# Patient Record
Sex: FEMALE | Race: WHITE | NOT HISPANIC OR LATINO | Employment: FULL TIME | URBAN - METROPOLITAN AREA
[De-identification: names, ages, dates, MRNs, and addresses within clinical notes are randomized per-mention and may not be internally consistent; named-entity substitution may affect disease eponyms.]

---

## 2024-04-30 PROBLEM — F41.8 DEPRESSION WITH ANXIETY: Status: ACTIVE | Noted: 2024-04-30

## 2024-04-30 PROBLEM — M32.9 SLE (SYSTEMIC LUPUS ERYTHEMATOSUS) (MULTI): Status: ACTIVE | Noted: 2024-04-30

## 2024-04-30 PROBLEM — M54.9 CHRONIC BACK PAIN: Status: ACTIVE | Noted: 2024-04-30

## 2024-04-30 PROBLEM — R05.9 COUGH: Status: ACTIVE | Noted: 2024-04-30

## 2024-04-30 PROBLEM — N83.209 SIMPLE OVARIAN CYST: Status: ACTIVE | Noted: 2024-04-30

## 2024-04-30 PROBLEM — G43.909 MIGRAINES: Status: ACTIVE | Noted: 2024-04-30

## 2024-04-30 PROBLEM — H92.09 OTALGIA: Status: ACTIVE | Noted: 2024-04-30

## 2024-04-30 PROBLEM — R43.0 LOSS OF SMELL: Status: ACTIVE | Noted: 2024-04-30

## 2024-04-30 PROBLEM — R11.2 NAUSEA AND/OR VOMITING: Status: ACTIVE | Noted: 2024-04-30

## 2024-04-30 PROBLEM — G89.29 CHRONIC BACK PAIN: Status: ACTIVE | Noted: 2024-04-30

## 2024-04-30 PROBLEM — K21.9 GERD (GASTROESOPHAGEAL REFLUX DISEASE): Status: ACTIVE | Noted: 2024-04-30

## 2024-04-30 PROBLEM — F41.9 ANXIETY: Status: ACTIVE | Noted: 2024-04-30

## 2024-04-30 RX ORDER — TOPIRAMATE 25 MG/1
50 TABLET ORAL 2 TIMES DAILY
COMMUNITY
Start: 2023-08-17

## 2024-04-30 RX ORDER — ONDANSETRON HYDROCHLORIDE 8 MG/1
8 TABLET, FILM COATED ORAL 3 TIMES DAILY PRN
COMMUNITY
Start: 2021-12-21

## 2024-04-30 RX ORDER — HYDROXYCHLOROQUINE SULFATE 200 MG/1
1 TABLET, FILM COATED ORAL 2 TIMES DAILY
COMMUNITY

## 2024-04-30 RX ORDER — ALBUTEROL SULFATE 90 UG/1
AEROSOL, METERED RESPIRATORY (INHALATION)
COMMUNITY
Start: 2021-12-21 | End: 2024-05-01 | Stop reason: WASHOUT

## 2024-04-30 RX ORDER — NEOMYCIN SULFATE, POLYMYXIN B SULFATE, HYDROCORTISONE 3.5; 10000; 1 MG/ML; [USP'U]/ML; MG/ML
3 SOLUTION/ DROPS AURICULAR (OTIC) 4 TIMES DAILY
COMMUNITY
Start: 2021-12-21 | End: 2024-05-01 | Stop reason: WASHOUT

## 2024-04-30 RX ORDER — RIZATRIPTAN BENZOATE 10 MG/1
10 TABLET ORAL
COMMUNITY
Start: 2014-11-05

## 2024-05-01 ENCOUNTER — OFFICE VISIT (OUTPATIENT)
Dept: PRIMARY CARE | Facility: CLINIC | Age: 39
End: 2024-05-01
Payer: COMMERCIAL

## 2024-05-01 VITALS
OXYGEN SATURATION: 99 % | HEART RATE: 71 BPM | HEIGHT: 67 IN | WEIGHT: 172.8 LBS | DIASTOLIC BLOOD PRESSURE: 80 MMHG | SYSTOLIC BLOOD PRESSURE: 110 MMHG | BODY MASS INDEX: 27.12 KG/M2 | TEMPERATURE: 98.1 F

## 2024-05-01 DIAGNOSIS — M32.9 SYSTEMIC LUPUS ERYTHEMATOSUS, UNSPECIFIED SLE TYPE, UNSPECIFIED ORGAN INVOLVEMENT STATUS (MULTI): Primary | ICD-10-CM

## 2024-05-01 DIAGNOSIS — F41.9 ANXIETY: ICD-10-CM

## 2024-05-01 PROCEDURE — 1036F TOBACCO NON-USER: CPT | Performed by: INTERNAL MEDICINE

## 2024-05-01 PROCEDURE — 99395 PREV VISIT EST AGE 18-39: CPT | Performed by: INTERNAL MEDICINE

## 2024-05-01 PROCEDURE — 99213 OFFICE O/P EST LOW 20 MIN: CPT | Performed by: INTERNAL MEDICINE

## 2024-05-01 RX ORDER — BUPROPION HYDROCHLORIDE 75 MG/1
TABLET ORAL
Qty: 60 TABLET | Refills: 1 | Status: SHIPPED | OUTPATIENT
Start: 2024-05-01 | End: 2024-06-03 | Stop reason: DRUGHIGH

## 2024-05-01 SDOH — ECONOMIC STABILITY: TRANSPORTATION INSECURITY
IN THE PAST 12 MONTHS, HAS LACK OF TRANSPORTATION KEPT YOU FROM MEETINGS, WORK, OR FROM GETTING THINGS NEEDED FOR DAILY LIVING?: NO

## 2024-05-01 SDOH — HEALTH STABILITY: PHYSICAL HEALTH: ON AVERAGE, HOW MANY DAYS PER WEEK DO YOU ENGAGE IN MODERATE TO STRENUOUS EXERCISE (LIKE A BRISK WALK)?: 3 DAYS

## 2024-05-01 SDOH — ECONOMIC STABILITY: TRANSPORTATION INSECURITY
IN THE PAST 12 MONTHS, HAS THE LACK OF TRANSPORTATION KEPT YOU FROM MEDICAL APPOINTMENTS OR FROM GETTING MEDICATIONS?: NO

## 2024-05-01 SDOH — HEALTH STABILITY: PHYSICAL HEALTH: ON AVERAGE, HOW MANY MINUTES DO YOU ENGAGE IN EXERCISE AT THIS LEVEL?: 60 MIN

## 2024-05-01 ASSESSMENT — PAIN SCALES - GENERAL: PAINLEVEL: 0-NO PAIN

## 2024-05-01 ASSESSMENT — PATIENT HEALTH QUESTIONNAIRE - PHQ9
2. FEELING DOWN, DEPRESSED OR HOPELESS: NOT AT ALL
1. LITTLE INTEREST OR PLEASURE IN DOING THINGS: NOT AT ALL
SUM OF ALL RESPONSES TO PHQ9 QUESTIONS 1 & 2: 0

## 2024-05-01 ASSESSMENT — LIFESTYLE VARIABLES
SKIP TO QUESTIONS 9-10: 1
HOW MANY STANDARD DRINKS CONTAINING ALCOHOL DO YOU HAVE ON A TYPICAL DAY: 1 OR 2
AUDIT-C TOTAL SCORE: 1
HOW OFTEN DO YOU HAVE SIX OR MORE DRINKS ON ONE OCCASION: NEVER
HOW OFTEN DO YOU HAVE A DRINK CONTAINING ALCOHOL: MONTHLY OR LESS

## 2024-05-01 NOTE — PROGRESS NOTES
"Chief Complaint   Patient presents with    Annual Exam     Pt here for AWV       38 y.o. for AWV  Last visit 12/2021.  Accompanied by her young daughter.  She would like medication to help with anxiety.  Previously took Wellbutrin which helped.  It was stopped when she had a rash years ago and.  In retrospect questions if the rash was from lupus.  She would like to try it again.  Her rheumatologist had tried Prozac again a lot of weight with it and did not feel right on that medication.  She stopped it about 2 months ago.    A lot of stress.  1 daughter was diagnosed with nephrotic syndrome.  Is a rare form.  Her other daughter that is with her today did a neuroblastoma.  Has surgical removal.  However now has Paola syndrome.  She does have a lot of anxiety.  Previously had trouble sleeping.  Rheumatologist at [facility] treats her lupus.    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    Past Medical History  Depression and anxiety  GERD EGD and colonoscopy Dr Manuel Quinonesgy to flu vaccine  Ovarian cyst  Migraine headaches. previously evaluated by Dr Momin  Chronic back pain: MRI 2016 herniated disk L5-S1 evaluated by Dr Luna  SLE  Erythema multiforme     Social: former smoker  Nurse  twin boys  2 daughters         Blood pressure 110/80, pulse 71, temperature 36.7 °C (98.1 °F), height 1.689 m (5' 6.5\"), weight 78.4 kg (172 lb 12.8 oz), SpO2 99%.  Body mass index is 27.47 kg/m².    Physical Examination  General: NAD. Alert.   HEENT: Normocephalic atraumatic.    Eyes: No erythema  Ears:  Hearing grossly intact.   Neck:  Supple. No thyroid goiter.  Lymph nodes: No cervical or clavicular adenopathy  Cardiovascular: Regular rate rhythm S1-S2 normal no murmur. No carotid bruit.   Lungs: Clear to Auscultation without wheezing, rales, or rhonchi, Breath sounds symmetric. No use of accessory muscles  Extremities: No pretibial edema  Neuro: no facial asymmetry.  No tremor   skin: no rash " noted  Musculoskeletal: No atrophy    ASSESSMENT/PLAN  AWV  Living Will: recommend  Cognition/Memory if 65 or above n/a  Hepatitis C (18-79)need to review. Do not see recent test.   HIV (18-64, once) will need to review  Women: mammogram: start age 40  Dexa: start age 65  Colon cancer screening 45 and older: start age 45    Problem List Items Addressed This Visit       Anxiety    Relevant Medications    buPROPion (Wellbutrin) 75 mg tablet    SLE (systemic lupus erythematosus) (Multi) - Primary    Current Assessment & Plan     Rheumatologist [facility]            Try wellbutrin.   Previously tried celexa but did not tolerate  Prozac caused weight gain and did not feel right on it  Consider effexor in the future   F/up 1 month to evaluate response to wellbutrin. No history of seizures or eating disorders  Medication use discussed with patient including potential benefits and side effects. Patient verbalized understanding and agreed to proceed.         Current Outpatient Medications on File Prior to Visit   Medication Sig Dispense Refill    hydroxychloroquine (Plaquenil) 200 mg tablet Take 1 tablet (200 mg) by mouth 2 times a day.      ondansetron (Zofran) 8 mg tablet Take 1 tablet (8 mg) by mouth 3 times a day as needed for nausea.      rizatriptan (Maxalt) 10 mg tablet Take 1 tablet (10 mg) by mouth. Take 1 tablet by mouth as needed for Migraine Headache (see administration instructions). Take at onset of headache. May repeat after 2 hours. Do not exceed 30 mg per day.      topiramate (Topamax) 25 mg tablet Take 2 tablets (50 mg) by mouth twice a day.      [DISCONTINUED] albuterol 90 mcg/actuation inhaler Inhale. INHALE 1 TO 2 PUFFS EVERY 4 TO 6 HOURS AS NEEDED.      [DISCONTINUED] neomycin-polymyxin-HC (Cortisporin) otic solution Administer 3 drops into the right ear 4 times a day.       No current facility-administered medications on file prior to visit.

## 2024-05-30 PROBLEM — I47.10 SVT (SUPRAVENTRICULAR TACHYCARDIA) (CMS-HCC): Status: ACTIVE | Noted: 2024-05-30

## 2024-05-30 PROBLEM — N20.0 KIDNEY STONES: Status: ACTIVE | Noted: 2024-05-30

## 2024-05-30 PROBLEM — R61 NIGHT SWEATS: Status: ACTIVE | Noted: 2024-01-10

## 2024-05-30 PROBLEM — Z97.5 PRESENCE OF INTRAUTERINE CONTRACEPTIVE DEVICE: Status: ACTIVE | Noted: 2024-05-30

## 2024-05-30 PROBLEM — M51.26 LUMBAR DISC HERNIATION: Status: ACTIVE | Noted: 2024-05-30

## 2024-05-30 PROBLEM — R63.5 WEIGHT GAIN: Status: ACTIVE | Noted: 2024-01-10

## 2024-05-30 PROBLEM — M47.816 LUMBAR SPONDYLOSIS: Status: ACTIVE | Noted: 2024-05-30

## 2024-05-30 PROBLEM — M72.2 PLANTAR FASCIITIS: Status: ACTIVE | Noted: 2024-05-30

## 2024-05-30 PROBLEM — C53.9 MALIGNANT NEOPLASM OF CERVIX (MULTI): Status: ACTIVE | Noted: 2024-05-30

## 2024-06-02 NOTE — PROGRESS NOTES
"Chief Complaint   Patient presents with    Follow-up     Pt here for 1 mo FUV       38 y.o. woman  Accompanied by her daughter  Last visit 05/01/2024  f/up anxiety. Re-try Wellbutrin.    Previously had weight gain with Prozac.  Does think it helping. Still has lot of anxiety but better. Before would have to push herself to get out of the house.   Used to do counseling but stopped. Had lot of doctor's appts each week for her daughter but no longer having so many appts. Thinks it would help to restart counseling. Has more time now to do it.   Can no longer sit down and watch a movie. Used to be able to do it.   Would wake up 4-5 times a night. Now waking up once around 4-5 am.       Past Medical History  Depression and anxiety  GERD EGD and colonoscopy Dr Manuel Park to flu vaccine  Ovarian cyst  Migraine headaches. previously evaluated by Dr Momin  Chronic back pain: MRI 2016 herniated disk L5-S1 evaluated by Dr Luna  SLE  Erythema multiforme     Social: former smoker  Nurse  twin boys  2 daughters         Blood pressure 100/62, pulse 75, temperature 36.7 °C (98 °F), height 1.689 m (5' 6.5\"), weight 79.1 kg (174 lb 6.4 oz), SpO2 99%.  Body mass index is 27.73 kg/m².  Good eye contact. Speech normal rate.         ASSESSMENT/PLAN  1. Anxiety  She is aware wellbutrin more helpful for depression but did notice improvement in anxiety.  Will increase from 75 mg bid to 100 mg bid.   - buPROPion SR (Wellbutrin SR) 100 mg 12 hr tablet; Take 1 tablet (100 mg) by mouth 2 times a day. Do not crush, chew, or split.  Dispense: 60 tablet; Refill: 0        Current Outpatient Medications on File Prior to Visit   Medication Sig Dispense Refill    buPROPion (Wellbutrin) 75 mg tablet One po qam for 3 days then one po bid. (Patient taking differently: Take by mouth.) 60 tablet 1    hydroxychloroquine (Plaquenil) 200 mg tablet Take 1 tablet (200 mg) by mouth 2 times a day.      ondansetron (Zofran) 8 mg tablet Take 1 tablet " (8 mg) by mouth 3 times a day as needed for nausea.      rizatriptan (Maxalt) 10 mg tablet Take 1 tablet (10 mg) by mouth. Take 1 tablet by mouth as needed for Migraine Headache (see administration instructions). Take at onset of headache. May repeat after 2 hours. Do not exceed 30 mg per day.      topiramate (Topamax) 25 mg tablet Take 2 tablets (50 mg) by mouth twice a day.       No current facility-administered medications on file prior to visit.

## 2024-06-03 ENCOUNTER — OFFICE VISIT (OUTPATIENT)
Dept: PRIMARY CARE | Facility: CLINIC | Age: 39
End: 2024-06-03
Payer: COMMERCIAL

## 2024-06-03 VITALS
BODY MASS INDEX: 27.37 KG/M2 | DIASTOLIC BLOOD PRESSURE: 62 MMHG | SYSTOLIC BLOOD PRESSURE: 100 MMHG | HEIGHT: 67 IN | OXYGEN SATURATION: 99 % | WEIGHT: 174.4 LBS | HEART RATE: 75 BPM | TEMPERATURE: 98 F

## 2024-06-03 DIAGNOSIS — F41.9 ANXIETY: Primary | ICD-10-CM

## 2024-06-03 PROBLEM — C53.9 MALIGNANT NEOPLASM OF CERVIX (MULTI): Status: RESOLVED | Noted: 2024-05-30 | Resolved: 2024-06-03

## 2024-06-03 PROCEDURE — 1036F TOBACCO NON-USER: CPT | Performed by: INTERNAL MEDICINE

## 2024-06-03 PROCEDURE — 99213 OFFICE O/P EST LOW 20 MIN: CPT | Performed by: INTERNAL MEDICINE

## 2024-06-03 RX ORDER — BUPROPION HYDROCHLORIDE 100 MG/1
100 TABLET, EXTENDED RELEASE ORAL 2 TIMES DAILY
Qty: 60 TABLET | Refills: 0 | Status: SHIPPED | OUTPATIENT
Start: 2024-06-03 | End: 2025-06-03

## 2024-06-03 ASSESSMENT — PATIENT HEALTH QUESTIONNAIRE - PHQ9
1. LITTLE INTEREST OR PLEASURE IN DOING THINGS: NOT AT ALL
2. FEELING DOWN, DEPRESSED OR HOPELESS: NOT AT ALL
SUM OF ALL RESPONSES TO PHQ9 QUESTIONS 1 & 2: 0

## 2024-06-03 ASSESSMENT — PAIN SCALES - GENERAL: PAINLEVEL: 0-NO PAIN

## 2024-06-28 DIAGNOSIS — F41.9 ANXIETY: ICD-10-CM

## 2024-07-01 RX ORDER — BUPROPION HYDROCHLORIDE 100 MG/1
100 TABLET, EXTENDED RELEASE ORAL 2 TIMES DAILY
Qty: 180 TABLET | Refills: 1 | Status: SHIPPED | OUTPATIENT
Start: 2024-07-01 | End: 2025-07-01

## 2024-07-17 RX ORDER — FLUOXETINE 10 MG/1
1 CAPSULE ORAL
COMMUNITY
Start: 2024-03-04 | End: 2024-07-22 | Stop reason: WASHOUT

## 2024-07-21 PROBLEM — H92.09 OTALGIA: Status: RESOLVED | Noted: 2024-04-30 | Resolved: 2024-07-21

## 2024-07-21 NOTE — PROGRESS NOTES
"Chief Complaint   Patient presents with    Follow-up     New med and discuss a mammogram and experiencing heartburn       38 y.o. woman  Accompanied by her daughter  Last visit 06/2024      f/up anxiety. Re-try Wellbutrin.  (Prior weight gain with Prozac.)  Better. Helping with anxiety. Has noticed a difference.   Sometimes waking up 2-2:30 am. Sometimes will sleep through the night. This happens about once a week.  When she does sleep through the night feels much better and does not have any headache.      Needing more tums. Daily. Stopped using red sauce. Rare pizza.   No coffee or pop. No chocolate.     Mom breast cancer age 59.   Patient has appt with gyn in August. Dr Stern.     Past Medical History  Depression and anxiety  GERD EGD and colonoscopy Dr Manuel Quinonesgy to flu vaccine  Ovarian cyst  Migraine headaches. 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  (daughter has medical problems)          Blood pressure 92/62, pulse 78, height 1.689 m (5' 6.5\"), weight 77.1 kg (170 lb).  Body mass index is 27.03 kg/m².    Vital reviewed  Neck: no cervical/clavicular adenopathy  CV: RRR S1 S2 normal. No murmur. No carotid bruit.   Lungs: CTA without wrr. Breath sounds symmetric  Abdomen: normoactive. Soft, nontender. No mass.  Extremities: no pretibial edema  Neuro: speech intact.   Skin tattoo left abdomen    Labs 01/2023 per Dr Dill      ASSESSMENT/PLAN    1. Anxiety  Improved. Will try 150 mg. If does not like 150 mg dose can lower back to 100 mg bid  F/up 3months and prn  - buPROPion SR (Wellbutrin SR) 150 mg 12 hr tablet; Take 1 tablet (150 mg) by mouth 2 times a day. Do not crush, chew, or split.  Dispense: 60 tablet; Refill: 1    2. Heartburn  - esomeprazole (NexIUM) 40 mg DR capsule; 1 po every " day for 30 days then stop  Dispense: 30 capsule; Refill: 0    3. Gastroesophageal reflux disease, unspecified whether esophagitis present  - CBC; Future  - Comprehensive Metabolic Panel; Future  If still has symptoms after 30 days then will need to see GI  She understands this    4. Lipid screening  - Lipid panel; Future    Pap per Dr Stern        Current Outpatient Medications on File Prior to Visit   Medication Sig Dispense Refill    buPROPion SR (Wellbutrin SR) 100 mg 12 hr tablet TAKE 1 TABLET (100 MG) BY MOUTH 2 TIMES A DAY. DO NOT CRUSH, CHEW, OR SPLIT. 180 tablet 1    hydroxychloroquine (Plaquenil) 200 mg tablet Take 1 tablet (200 mg) by mouth 2 times a day.      ondansetron (Zofran) 8 mg tablet Take 1 tablet (8 mg) by mouth 3 times a day as needed for nausea.      rizatriptan (Maxalt) 10 mg tablet Take 1 tablet (10 mg) by mouth. Take 1 tablet by mouth as needed for Migraine Headache (see administration instructions). Take at onset of headache. May repeat after 2 hours. Do not exceed 30 mg per day.      topiramate (Topamax) 25 mg tablet Take 2 tablets (50 mg) by mouth twice a day.      FLUoxetine (PROzac) 10 mg capsule Take 1 capsule (10 mg) by mouth early in the morning..       No current facility-administered medications on file prior to visit.

## 2024-07-22 ENCOUNTER — APPOINTMENT (OUTPATIENT)
Dept: PRIMARY CARE | Facility: CLINIC | Age: 39
End: 2024-07-22
Payer: COMMERCIAL

## 2024-07-22 VITALS
WEIGHT: 170 LBS | HEART RATE: 78 BPM | HEIGHT: 67 IN | DIASTOLIC BLOOD PRESSURE: 62 MMHG | SYSTOLIC BLOOD PRESSURE: 92 MMHG | BODY MASS INDEX: 26.68 KG/M2

## 2024-07-22 DIAGNOSIS — K21.9 GASTROESOPHAGEAL REFLUX DISEASE, UNSPECIFIED WHETHER ESOPHAGITIS PRESENT: ICD-10-CM

## 2024-07-22 DIAGNOSIS — Z13.220 LIPID SCREENING: ICD-10-CM

## 2024-07-22 DIAGNOSIS — R12 HEARTBURN: ICD-10-CM

## 2024-07-22 DIAGNOSIS — F41.9 ANXIETY: Primary | ICD-10-CM

## 2024-07-22 PROBLEM — I47.10 SVT (SUPRAVENTRICULAR TACHYCARDIA) (CMS-HCC): Status: RESOLVED | Noted: 2024-05-30 | Resolved: 2024-07-22

## 2024-07-22 PROCEDURE — 99214 OFFICE O/P EST MOD 30 MIN: CPT | Performed by: INTERNAL MEDICINE

## 2024-07-22 PROCEDURE — 1036F TOBACCO NON-USER: CPT | Performed by: INTERNAL MEDICINE

## 2024-07-22 PROCEDURE — 3008F BODY MASS INDEX DOCD: CPT | Performed by: INTERNAL MEDICINE

## 2024-07-22 RX ORDER — ESOMEPRAZOLE MAGNESIUM 40 MG/1
CAPSULE, DELAYED RELEASE ORAL
Qty: 30 CAPSULE | Refills: 0 | Status: SHIPPED | OUTPATIENT
Start: 2024-07-22

## 2024-07-22 RX ORDER — BUPROPION HYDROCHLORIDE 150 MG/1
150 TABLET, EXTENDED RELEASE ORAL 2 TIMES DAILY
Qty: 60 TABLET | Refills: 1 | Status: SHIPPED | OUTPATIENT
Start: 2024-07-22 | End: 2024-09-20

## 2024-07-22 ASSESSMENT — PATIENT HEALTH QUESTIONNAIRE - PHQ9
SUM OF ALL RESPONSES TO PHQ9 QUESTIONS 1 AND 2: 0
2. FEELING DOWN, DEPRESSED OR HOPELESS: NOT AT ALL
1. LITTLE INTEREST OR PLEASURE IN DOING THINGS: NOT AT ALL

## 2024-07-30 ENCOUNTER — HOSPITAL ENCOUNTER (EMERGENCY)
Facility: HOSPITAL | Age: 39
Discharge: HOME | End: 2024-07-30
Attending: STUDENT IN AN ORGANIZED HEALTH CARE EDUCATION/TRAINING PROGRAM
Payer: COMMERCIAL

## 2024-07-30 VITALS
OXYGEN SATURATION: 99 % | HEART RATE: 84 BPM | TEMPERATURE: 97.3 F | BODY MASS INDEX: 27 KG/M2 | RESPIRATION RATE: 16 BRPM | WEIGHT: 168 LBS | SYSTOLIC BLOOD PRESSURE: 126 MMHG | DIASTOLIC BLOOD PRESSURE: 68 MMHG | HEIGHT: 66 IN

## 2024-07-30 DIAGNOSIS — L73.9 FOLLICULITIS: Primary | ICD-10-CM

## 2024-07-30 DIAGNOSIS — L02.91 ABSCESS: ICD-10-CM

## 2024-07-30 DIAGNOSIS — L03.032 ACUTE PARONYCHIA OF TOE OF LEFT FOOT: ICD-10-CM

## 2024-07-30 PROCEDURE — 56405 I&D VULVA/PERINEAL ABSCESS: CPT

## 2024-07-30 PROCEDURE — 99283 EMERGENCY DEPT VISIT LOW MDM: CPT | Mod: 25

## 2024-07-30 PROCEDURE — 2500000005 HC RX 250 GENERAL PHARMACY W/O HCPCS: Performed by: STUDENT IN AN ORGANIZED HEALTH CARE EDUCATION/TRAINING PROGRAM

## 2024-07-30 PROCEDURE — 10060 I&D ABSCESS SIMPLE/SINGLE: CPT | Performed by: STUDENT IN AN ORGANIZED HEALTH CARE EDUCATION/TRAINING PROGRAM

## 2024-07-30 PROCEDURE — 10060 I&D ABSCESS SIMPLE/SINGLE: CPT

## 2024-07-30 PROCEDURE — 99283 EMERGENCY DEPT VISIT LOW MDM: CPT | Performed by: STUDENT IN AN ORGANIZED HEALTH CARE EDUCATION/TRAINING PROGRAM

## 2024-07-30 RX ORDER — CEPHALEXIN 500 MG/1
500 CAPSULE ORAL 3 TIMES DAILY
Qty: 30 CAPSULE | Refills: 0 | Status: SHIPPED | OUTPATIENT
Start: 2024-07-30 | End: 2024-08-09

## 2024-07-30 RX ORDER — LIDOCAINE HYDROCHLORIDE 10 MG/ML
5 INJECTION, SOLUTION EPIDURAL; INFILTRATION; INTRACAUDAL; PERINEURAL ONCE
Status: COMPLETED | OUTPATIENT
Start: 2024-07-30 | End: 2024-07-30

## 2024-07-30 ASSESSMENT — PAIN - FUNCTIONAL ASSESSMENT
PAIN_FUNCTIONAL_ASSESSMENT: 0-10
PAIN_FUNCTIONAL_ASSESSMENT: 0-10

## 2024-07-30 ASSESSMENT — COLUMBIA-SUICIDE SEVERITY RATING SCALE - C-SSRS
1. IN THE PAST MONTH, HAVE YOU WISHED YOU WERE DEAD OR WISHED YOU COULD GO TO SLEEP AND NOT WAKE UP?: NO
6. HAVE YOU EVER DONE ANYTHING, STARTED TO DO ANYTHING, OR PREPARED TO DO ANYTHING TO END YOUR LIFE?: NO
2. HAVE YOU ACTUALLY HAD ANY THOUGHTS OF KILLING YOURSELF?: NO

## 2024-07-30 ASSESSMENT — PAIN SCALES - GENERAL
PAINLEVEL_OUTOF10: 2
PAINLEVEL_OUTOF10: 6

## 2024-07-31 NOTE — ED PROVIDER NOTES
HPI   Chief Complaint   Patient presents with    Abscess     Abscess in groin around right labia; has pus coming out of it; patient also blister on left great toe and was put on bactrim; toe also not better       Patient is a 38-year-old female coming in for to the [place] emergency department with a chief complaint of a right labial abscess that has been discharging pus and a blister on her left toe that has been exquisitely tender.  On  the patient had developed a blister and on  she had to go to the urgent care to get the blister looked at her had developed an infection where she was given Bactrim to treat the lesion.  Patient reports that the Bactrim is not controlling the infection.  Patient reports that 2 days ago this abscess formed lateral to the right labia where initially it was soft and it became firm very quickly and is extremely tender to the touch to where she has trouble driving because of the friction in the region.              Patient History   No past medical history on file.  Past Surgical History:   Procedure Laterality Date    APPENDECTOMY      APPENDECTOMY      CHOLECYSTECTOMY      HERNIA REPAIR      OTHER SURGICAL HISTORY  2018    Colonoscopy    OTHER SURGICAL HISTORY  2018    Appendectomy    OTHER SURGICAL HISTORY  2018    Cholecystectomy     Family History   Problem Relation Name Age of Onset    Breast cancer Mother      Other (thyroid problem) Mother       Social History     Tobacco Use    Smoking status: Former     Current packs/day: 0.00     Types: Cigarettes     Quit date:      Years since quittin.5    Smokeless tobacco: Never   Vaping Use    Vaping status: Never Used   Substance Use Topics    Alcohol use: Yes     Alcohol/week: 1.0 standard drink of alcohol     Types: 1 Standard drinks or equivalent per week     Comment: rare    Drug use: Never       Physical Exam   ED Triage Vitals [24 2108]   Temperature Heart Rate Respirations BP   36.3  °C (97.3 °F) 86 17 139/76      Pulse Ox Temp src Heart Rate Source Patient Position   99 % -- -- --      BP Location FiO2 (%)     -- --       Physical Exam  Vitals and nursing note reviewed. Exam conducted with a chaperone present.   Constitutional:       General: She is not in acute distress.     Appearance: She is well-developed.   HENT:      Head: Normocephalic and atraumatic.   Eyes:      Conjunctiva/sclera: Conjunctivae normal.   Cardiovascular:      Rate and Rhythm: Normal rate and regular rhythm.      Heart sounds: No murmur heard.  Pulmonary:      Effort: Pulmonary effort is normal. No respiratory distress.      Breath sounds: Normal breath sounds.   Abdominal:      Palpations: Abdomen is soft.      Tenderness: There is no abdominal tenderness.   Genitourinary:     Labia:         Right: Tenderness and lesion present.       Vagina: No vaginal discharge.       Musculoskeletal:         General: No swelling.        Legs:    Skin:     General: Skin is warm and dry.      Capillary Refill: Capillary refill takes less than 2 seconds.   Neurological:      Mental Status: She is alert.   Psychiatric:         Mood and Affect: Mood normal.           ED Course & MDM   Diagnoses as of 07/30/24 2320   Folliculitis   Abscess   Acute paronychia of toe of left foot                       César Coma Scale Score: 15                        Medical Decision Making  The patient has a pseudofolliculitis in the vulvar region and an infected and erythematous paronychia on the left great toe.    A digital block was done of the left great toe and all the pus expressed along with blood to help relieve of the local infection.  Initially an incision and drainage was going to be performed but due to the blisters break it once using to express the area.  The Bactrim that was prescribed on the 28th was not relieving the infection of the area and Keflex was ordered to treat the patient outpatient.    Pseudofolliculitis in the vulvar region  was exquisitely tender but was not very large and was lanced with an 18-gauge syringe and all of the pus drained.  The Keflex that was ordered treats the potential systemic effects from the pseudofolliculitis as well as the systemic effects from the paronychia.    Both regions were sterilized with alcohol pads and lidocaine was used for any incisions or expressing of purulence was done.             Chau Polo,   Resident  07/31/24 0116

## 2024-07-31 NOTE — DISCHARGE INSTRUCTIONS
Follow-up with primary care provider.  Return to the emergency department if increased erythema, red streaking on the skin of the leg develops, red streaking up the abdomen develops, increased fevers over the next 48 hours, streaking of the abdomen severe worsening pain increased reddening of the abdomen, or increased discharge.

## 2024-08-01 NOTE — ED PROCEDURE NOTE
Procedure  Incision and Drainage    Performed by: Ayo Albert DO  Authorized by: Ayo Albert DO    Consent:     Consent obtained:  Verbal    Consent given by:  Patient    Risks, benefits, and alternatives were discussed: yes      Risks discussed:  Bleeding, pain and infection    Alternatives discussed:  No treatment  Universal protocol:     Procedure explained and questions answered to patient or proxy's satisfaction: yes      Site/side marked: yes      Patient identity confirmed:  Verbally with patient  Location:     Type:  Abscess    Size:  Paronychia L great toe    Location:  Lower extremity    Lower extremity location:  Foot    Foot location:  L foot  Pre-procedure details:     Skin preparation:  Chlorhexidine with alcohol  Sedation:     Sedation type:  None  Anesthesia:     Anesthesia method:  Local infiltration    Local anesthetic:  Lidocaine 1% w/o epi  Procedure type:     Complexity:  Simple  Procedure details:     Ultrasound guidance: no      Incision types:  Stab incision    Incision depth:  Subcutaneous    Drainage:  Bloody and purulent    Drainage amount:  Scant    Wound treatment:  Wound left open    Packing materials:  None  Post-procedure details:     Procedure completion:  Tolerated               Ayo Albert DO  08/01/24 0810

## 2024-08-13 DIAGNOSIS — F41.9 ANXIETY: ICD-10-CM

## 2024-08-13 DIAGNOSIS — R12 HEARTBURN: ICD-10-CM

## 2024-08-13 RX ORDER — BUPROPION HYDROCHLORIDE 150 MG/1
150 TABLET, EXTENDED RELEASE ORAL 2 TIMES DAILY
Qty: 180 TABLET | Refills: 3 | Status: SHIPPED | OUTPATIENT
Start: 2024-08-13 | End: 2025-08-08

## 2024-08-13 RX ORDER — ESOMEPRAZOLE MAGNESIUM 40 MG/1
CAPSULE, DELAYED RELEASE ORAL
Qty: 90 CAPSULE | Refills: 1 | OUTPATIENT
Start: 2024-08-13

## 2024-10-22 ENCOUNTER — APPOINTMENT (OUTPATIENT)
Dept: PRIMARY CARE | Facility: CLINIC | Age: 39
End: 2024-10-22
Payer: COMMERCIAL

## 2024-10-22 VITALS
DIASTOLIC BLOOD PRESSURE: 78 MMHG | HEIGHT: 66 IN | BODY MASS INDEX: 27.8 KG/M2 | HEART RATE: 80 BPM | SYSTOLIC BLOOD PRESSURE: 102 MMHG | OXYGEN SATURATION: 98 % | TEMPERATURE: 98.2 F | WEIGHT: 173 LBS

## 2024-10-22 DIAGNOSIS — R63.5 WEIGHT GAIN: Primary | ICD-10-CM

## 2024-10-22 DIAGNOSIS — G43.009 MIGRAINE WITHOUT AURA AND WITHOUT STATUS MIGRAINOSUS, NOT INTRACTABLE: ICD-10-CM

## 2024-10-22 DIAGNOSIS — R73.9 HYPERGLYCEMIA: ICD-10-CM

## 2024-10-22 PROCEDURE — 3008F BODY MASS INDEX DOCD: CPT | Performed by: INTERNAL MEDICINE

## 2024-10-22 PROCEDURE — 1036F TOBACCO NON-USER: CPT | Performed by: INTERNAL MEDICINE

## 2024-10-22 PROCEDURE — 99214 OFFICE O/P EST MOD 30 MIN: CPT | Performed by: INTERNAL MEDICINE

## 2024-10-22 RX ORDER — RIZATRIPTAN BENZOATE 10 MG/1
10 TABLET ORAL ONCE AS NEEDED
Qty: 9 TABLET | Refills: 11 | Status: SHIPPED | OUTPATIENT
Start: 2024-10-22 | End: 2025-10-22

## 2024-10-22 RX ORDER — EPINEPHRINE 0.3 MG/.3ML
INJECTION SUBCUTANEOUS
COMMUNITY
Start: 2024-09-29

## 2024-10-22 RX ORDER — TOPIRAMATE 50 MG/1
50 TABLET, FILM COATED ORAL 2 TIMES DAILY
Qty: 180 TABLET | Refills: 3 | Status: SHIPPED | OUTPATIENT
Start: 2024-10-22

## 2024-10-22 ASSESSMENT — PAIN SCALES - GENERAL: PAINLEVEL_OUTOF10: 0-NO PAIN

## 2024-10-22 ASSESSMENT — PATIENT HEALTH QUESTIONNAIRE - PHQ9
1. LITTLE INTEREST OR PLEASURE IN DOING THINGS: NOT AT ALL
SUM OF ALL RESPONSES TO PHQ9 QUESTIONS 1 & 2: 0
2. FEELING DOWN, DEPRESSED OR HOPELESS: NOT AT ALL

## 2024-10-22 NOTE — PROGRESS NOTES
"Chief Complaint   Patient presents with    Follow-up     Pt here for 3 mo FUV       39 y.o. woman  Accompanied by her daughter  Last visit 07/2024    f/up anxiety. Re-try Wellbutrin.  (Prior weight gain with Prozac.)  Doing well on wellbutrin.   Trouble losing weight. Drinks mostly water. Maybe juice 1-2 times a week  Eating lean protein. Salads.  Working with  once a week.    Mom breast cancer age 59.      Past Medical History  Depression and anxiety  GERD EGD and colonoscopy Dr Manuel Quinonesgy to flu vaccine  Ovarian cyst  Migraine headaches. 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  (daughter has medical problems)          Blood pressure 102/78, pulse 80, temperature 36.8 °C (98.2 °F), height 1.676 m (5' 6\"), weight 78.5 kg (173 lb), SpO2 98%.  Body mass index is 27.92 kg/m².    Vital reviewed  Neck: no cervical/clavicular adenopathy  CV: RRR S1 S2 normal. No murmur. No carotid bruit.   Lungs: CTA without wrr. Breath sounds symmetric  Abdomen: normoactive. Soft, nontender. No mass. Scar.  Extremities: no pretibial edema  Neuro: speech intact.     Labs 01/2023 per Dr Dill  Labs ER (on phone) elevated glucose.     ASSESSMENT/PLAN  1. Weight gain (Primary)  - Thyroid Stimulating Hormone; Future  - Triiodothyronine, Free; Future  - Thyroxine, Free; Future  Discussed med options-advised see DR Crawford for possible adipex.     2. Hyperglycemia  - Hemoglobin A1C; Future    3. Migraine without aura and without status migrainosus, not intractable  - rizatriptan (Maxalt) 10 mg tablet; Take 1 tablet (10 mg) by mouth 1 time if needed for migraine. Take 1 tablet by mouth as needed for Migraine Headache (see administration instructions). Take at onset of headache. May repeat " after 2 hours. Do not exceed 30 mg per day.  Dispense: 9 tablet; Refill: 11  - topiramate (Topamax) 50 mg tablet; Take 1 tablet (50 mg) by mouth 2 times a day.  Dispense: 180 tablet; Refill: 3    Labs as previously ordered  Had mammogram per gyn for cyst    Pap per Dr Stern        Current Outpatient Medications on File Prior to Visit   Medication Sig Dispense Refill    buPROPion SR (Wellbutrin SR) 150 mg 12 hr tablet TAKE 1 TABLET (150 MG) BY MOUTH 2 TIMES A DAY. DO NOT CRUSH, CHEW, OR SPLIT. 180 tablet 3    EPINEPHrine 0.3 mg/0.3 mL injection syringe INJECT 0.3 MG INTRAMUSCULAR ONCE      hydroxychloroquine (Plaquenil) 200 mg tablet Take 1 tablet (200 mg) by mouth 2 times a day.      ondansetron (Zofran) 8 mg tablet Take 1 tablet (8 mg) by mouth 3 times a day as needed for nausea.      rizatriptan (Maxalt) 10 mg tablet Take 1 tablet (10 mg) by mouth. Take 1 tablet by mouth as needed for Migraine Headache (see administration instructions). Take at onset of headache. May repeat after 2 hours. Do not exceed 30 mg per day.      topiramate (Topamax) 25 mg tablet Take 2 tablets (50 mg) by mouth twice a day.      esomeprazole (NexIUM) 40 mg DR capsule 1 po every day for 30 days then stop (Patient not taking: Reported on 10/22/2024) 30 capsule 0     No current facility-administered medications on file prior to visit.

## 2024-10-23 PROBLEM — T78.40XA ALLERGIC REACTION: Status: ACTIVE | Noted: 2024-09-29

## 2024-10-25 ENCOUNTER — OFFICE VISIT (OUTPATIENT)
Dept: PRIMARY CARE | Facility: CLINIC | Age: 39
End: 2024-10-25
Payer: COMMERCIAL

## 2024-10-25 VITALS
HEART RATE: 75 BPM | SYSTOLIC BLOOD PRESSURE: 112 MMHG | WEIGHT: 173 LBS | HEIGHT: 66 IN | TEMPERATURE: 97.7 F | DIASTOLIC BLOOD PRESSURE: 77 MMHG | BODY MASS INDEX: 27.8 KG/M2

## 2024-10-25 DIAGNOSIS — Z71.3 ENCOUNTER FOR WEIGHT LOSS COUNSELING: Primary | ICD-10-CM

## 2024-10-25 DIAGNOSIS — R63.5 WEIGHT GAIN: ICD-10-CM

## 2024-10-25 DIAGNOSIS — M51.26 LUMBAR DISC HERNIATION: ICD-10-CM

## 2024-10-25 DIAGNOSIS — G89.29 CHRONIC BILATERAL LOW BACK PAIN, UNSPECIFIED WHETHER SCIATICA PRESENT: ICD-10-CM

## 2024-10-25 DIAGNOSIS — M54.50 CHRONIC BILATERAL LOW BACK PAIN, UNSPECIFIED WHETHER SCIATICA PRESENT: ICD-10-CM

## 2024-10-25 DIAGNOSIS — E66.3 OVERWEIGHT WITH BODY MASS INDEX (BMI) OF 28 TO 28.9 IN ADULT: ICD-10-CM

## 2024-10-25 PROCEDURE — 3008F BODY MASS INDEX DOCD: CPT | Performed by: INTERNAL MEDICINE

## 2024-10-25 PROCEDURE — 99213 OFFICE O/P EST LOW 20 MIN: CPT | Performed by: INTERNAL MEDICINE

## 2024-10-25 PROCEDURE — 1036F TOBACCO NON-USER: CPT | Performed by: INTERNAL MEDICINE

## 2024-10-25 RX ORDER — PHENTERMINE HYDROCHLORIDE 37.5 MG/1
37.5 TABLET ORAL
Qty: 30 TABLET | Refills: 0 | Status: SHIPPED | OUTPATIENT
Start: 2024-10-25 | End: 2024-11-24

## 2024-10-25 ASSESSMENT — PATIENT HEALTH QUESTIONNAIRE - PHQ9
1. LITTLE INTEREST OR PLEASURE IN DOING THINGS: NOT AT ALL
SUM OF ALL RESPONSES TO PHQ9 QUESTIONS 1 AND 2: 0
2. FEELING DOWN, DEPRESSED OR HOPELESS: NOT AT ALL

## 2024-10-25 ASSESSMENT — ENCOUNTER SYMPTOMS
NAUSEA: 0
AGITATION: 0
DIARRHEA: 0
CONSTIPATION: 0
LIGHT-HEADEDNESS: 0
BLOOD IN STOOL: 0
VOMITING: 0
SHORTNESS OF BREATH: 0
COUGH: 0
DIZZINESS: 0
CHILLS: 0
DYSURIA: 0
PALPITATIONS: 0
ABDOMINAL PAIN: 0
FEVER: 0
SORE THROAT: 0

## 2024-10-25 NOTE — PROGRESS NOTES
Subjective   Patient ID: Jamie Castro is a 39 y.o. female who presents for Follow-up (Discuss weight loss options).    HPI patient's regular primary care physician is Dr. De Souza.  Patient was referred to me by her doctor for evaluation possible weight loss medication.  Patient is steadily gaining weight and is concerned about this.  States she had seen her OB/GYN and he had did testing related to female hormones and other blood work but did not find any outstanding abnormalities.  Patient on Wellbutrin for anxiety and Topamax for migraine headaches.  Patient diagnosed with Lupus in 2019. Patient is having difficulty losing weight with diet and exercise. She has gained about 33lbs since 2019.  Patient had her third child in 2018 and has gained weight since that time.  Patient has an IUD for birth control. Denies smoking. Patient eats oatmeal in the morning, fresh salads, grilled chicken, vegetables during the week. Drinks water and has eliminated pop. She was working out a lot on her own,. She works out at a gym twice a week and seeing a .     Patient has history of cardiac murmur was evaluated in the past by cardiology and has had echocardiogram.  Asymptomatic with this.  No palpitations, chest pain, shortness of breath, syncope or lightheadedness or dizziness.    Review of Systems   Constitutional:  Negative for chills and fever.   HENT:  Negative for sore throat.    Eyes:  Negative for visual disturbance.   Respiratory:  Negative for cough and shortness of breath.    Cardiovascular:  Negative for chest pain, palpitations and leg swelling.   Gastrointestinal:  Negative for abdominal pain, blood in stool, constipation, diarrhea, nausea and vomiting.   Genitourinary:  Negative for dysuria.   Skin:  Negative for rash.   Neurological:  Negative for dizziness, syncope and light-headedness.   Psychiatric/Behavioral:  Negative for agitation.        Objective   /77 (BP Location: Left arm, Patient Position:  "Sitting, BP Cuff Size: Adult)   Pulse 75   Temp 36.5 °C (97.7 °F)   Ht 1.676 m (5' 6\")   Wt 78.5 kg (173 lb)   BMI 27.92 kg/m²     Physical Exam  Vitals and nursing note reviewed.   Constitutional:       General: She is not in acute distress.     Appearance: Normal appearance. She is not ill-appearing, toxic-appearing or diaphoretic.   HENT:      Head: Normocephalic and atraumatic.   Cardiovascular:      Rate and Rhythm: Normal rate and regular rhythm.      Heart sounds: Murmur heard.   Pulmonary:      Effort: Pulmonary effort is normal. No respiratory distress.      Breath sounds: Normal breath sounds. No wheezing, rhonchi or rales.   Musculoskeletal:      Cervical back: Neck supple.      Right lower leg: No edema.      Left lower leg: No edema.   Lymphadenopathy:      Cervical: No cervical adenopathy.   Skin:     General: Skin is warm and dry.      Coloration: Skin is not jaundiced or pale.      Findings: No rash.   Neurological:      General: No focal deficit present.      Mental Status: She is alert and oriented to person, place, and time.      Cranial Nerves: No cranial nerve deficit.   Psychiatric:         Mood and Affect: Mood normal.         Behavior: Behavior normal.         Thought Content: Thought content normal.         Judgment: Judgment normal.         Assessment/Plan   Problem List Items Addressed This Visit             ICD-10-CM    Chronic back pain M54.9, G89.29    Relevant Medications    phentermine (Adipex-P) 37.5 mg tablet    Lumbar disc herniation M51.26    Relevant Medications    phentermine (Adipex-P) 37.5 mg tablet    Weight gain R63.5    Overweight with body mass index (BMI) of 28 to 28.9 in adult E66.3, Z68.28    Relevant Medications    phentermine (Adipex-P) 37.5 mg tablet    Encounter for weight loss counseling - Primary Z71.3     Encounter for weight loss counseling/weight gain/overweight/chronic back pain: Patient has weight related comorbidity of chronic back pain/lumbar disc " herniation.  BMI is 28.34 upon rechecking her height and weight.  She is a candidate for phentermine based upon her BMI and weight related comorbidity.  OARRS report is reviewed and appropriate.  Our goal will be to jumpstart her weight loss with phentermine may consider increasing her topiramate and/or adding metformin to help with weight loss.  She is already on Topamax at 50 mg twice daily and already on Wellbutrin as well.  Other options would be to possibly add naltrexone to the Wellbutrin to form Contrave or consider compounding pharmacy weight loss medication such as semaglutide.  At this time she will continue diet and exercise modification and will do a trial of phentermine for the next month that she will follow-up for recheck.  We discussed possible side effects of the medication which she is aware of.  She wishes to continue after discussing the risk versus benefits of the medication.  She has an IUD.  No plans to become pregnant.

## 2024-10-31 ENCOUNTER — APPOINTMENT (OUTPATIENT)
Dept: PRIMARY CARE | Facility: CLINIC | Age: 39
End: 2024-10-31
Payer: COMMERCIAL

## 2024-11-04 ENCOUNTER — APPOINTMENT (OUTPATIENT)
Dept: PRIMARY CARE | Facility: CLINIC | Age: 39
End: 2024-11-04
Payer: COMMERCIAL

## 2024-11-20 ENCOUNTER — APPOINTMENT (OUTPATIENT)
Dept: PRIMARY CARE | Facility: CLINIC | Age: 39
End: 2024-11-20
Payer: COMMERCIAL

## 2024-11-20 ENCOUNTER — LAB (OUTPATIENT)
Dept: LAB | Facility: LAB | Age: 39
End: 2024-11-20
Payer: COMMERCIAL

## 2024-11-20 VITALS
DIASTOLIC BLOOD PRESSURE: 79 MMHG | WEIGHT: 162 LBS | BODY MASS INDEX: 26.99 KG/M2 | HEIGHT: 65 IN | SYSTOLIC BLOOD PRESSURE: 110 MMHG | TEMPERATURE: 97.7 F | HEART RATE: 63 BPM

## 2024-11-20 DIAGNOSIS — R63.5 WEIGHT GAIN: ICD-10-CM

## 2024-11-20 DIAGNOSIS — Z23 NEED FOR INFLUENZA VACCINATION: ICD-10-CM

## 2024-11-20 DIAGNOSIS — R73.9 HYPERGLYCEMIA: ICD-10-CM

## 2024-11-20 DIAGNOSIS — Z71.3 ENCOUNTER FOR WEIGHT LOSS COUNSELING: Primary | ICD-10-CM

## 2024-11-20 DIAGNOSIS — E66.3 OVERWEIGHT WITH BODY MASS INDEX (BMI) OF 26 TO 26.9 IN ADULT: ICD-10-CM

## 2024-11-20 DIAGNOSIS — K21.9 GASTROESOPHAGEAL REFLUX DISEASE, UNSPECIFIED WHETHER ESOPHAGITIS PRESENT: ICD-10-CM

## 2024-11-20 DIAGNOSIS — Z13.220 LIPID SCREENING: ICD-10-CM

## 2024-11-20 LAB
ALBUMIN SERPL BCP-MCNC: 4.7 G/DL (ref 3.4–5)
ALP SERPL-CCNC: 35 U/L (ref 33–110)
ALT SERPL W P-5'-P-CCNC: 17 U/L (ref 7–45)
ANION GAP SERPL CALC-SCNC: 12 MMOL/L (ref 10–20)
AST SERPL W P-5'-P-CCNC: 19 U/L (ref 9–39)
BILIRUB SERPL-MCNC: 0.4 MG/DL (ref 0–1.2)
BUN SERPL-MCNC: 7 MG/DL (ref 6–23)
CALCIUM SERPL-MCNC: 9.3 MG/DL (ref 8.6–10.3)
CHLORIDE SERPL-SCNC: 104 MMOL/L (ref 98–107)
CHOLEST SERPL-MCNC: 134 MG/DL (ref 0–199)
CHOLESTEROL/HDL RATIO: 3
CO2 SERPL-SCNC: 28 MMOL/L (ref 21–32)
CREAT SERPL-MCNC: 0.75 MG/DL (ref 0.5–1.05)
EGFRCR SERPLBLD CKD-EPI 2021: >90 ML/MIN/1.73M*2
ERYTHROCYTE [DISTWIDTH] IN BLOOD BY AUTOMATED COUNT: 12.9 % (ref 11.5–14.5)
GLUCOSE SERPL-MCNC: 89 MG/DL (ref 74–99)
HCT VFR BLD AUTO: 40.4 % (ref 36–46)
HDLC SERPL-MCNC: 44.2 MG/DL
HGB BLD-MCNC: 12.7 G/DL (ref 12–16)
LDLC SERPL CALC-MCNC: 74 MG/DL
MCH RBC QN AUTO: 29.8 PG (ref 26–34)
MCHC RBC AUTO-ENTMCNC: 31.4 G/DL (ref 32–36)
MCV RBC AUTO: 95 FL (ref 80–100)
NON HDL CHOLESTEROL: 90 MG/DL (ref 0–149)
NRBC BLD-RTO: 0 /100 WBCS (ref 0–0)
PLATELET # BLD AUTO: 291 X10*3/UL (ref 150–450)
POTASSIUM SERPL-SCNC: 3.9 MMOL/L (ref 3.5–5.3)
PROT SERPL-MCNC: 7 G/DL (ref 6.4–8.2)
RBC # BLD AUTO: 4.26 X10*6/UL (ref 4–5.2)
SODIUM SERPL-SCNC: 140 MMOL/L (ref 136–145)
T3FREE SERPL-MCNC: 3.4 PG/ML (ref 2.3–4.2)
T4 FREE SERPL-MCNC: 0.97 NG/DL (ref 0.61–1.12)
TRIGL SERPL-MCNC: 81 MG/DL (ref 0–149)
TSH SERPL-ACNC: 1.14 MIU/L (ref 0.44–3.98)
VLDL: 16 MG/DL (ref 0–40)
WBC # BLD AUTO: 6 X10*3/UL (ref 4.4–11.3)

## 2024-11-20 PROCEDURE — 84443 ASSAY THYROID STIM HORMONE: CPT

## 2024-11-20 PROCEDURE — 80061 LIPID PANEL: CPT

## 2024-11-20 PROCEDURE — 80053 COMPREHEN METABOLIC PANEL: CPT

## 2024-11-20 PROCEDURE — 36415 COLL VENOUS BLD VENIPUNCTURE: CPT

## 2024-11-20 PROCEDURE — 84481 FREE ASSAY (FT-3): CPT

## 2024-11-20 PROCEDURE — 90656 IIV3 VACC NO PRSV 0.5 ML IM: CPT | Performed by: INTERNAL MEDICINE

## 2024-11-20 PROCEDURE — 90471 IMMUNIZATION ADMIN: CPT | Performed by: INTERNAL MEDICINE

## 2024-11-20 PROCEDURE — 84439 ASSAY OF FREE THYROXINE: CPT

## 2024-11-20 PROCEDURE — 1036F TOBACCO NON-USER: CPT | Performed by: INTERNAL MEDICINE

## 2024-11-20 PROCEDURE — 85027 COMPLETE CBC AUTOMATED: CPT

## 2024-11-20 PROCEDURE — 3008F BODY MASS INDEX DOCD: CPT | Performed by: INTERNAL MEDICINE

## 2024-11-20 PROCEDURE — 83036 HEMOGLOBIN GLYCOSYLATED A1C: CPT

## 2024-11-20 PROCEDURE — 99213 OFFICE O/P EST LOW 20 MIN: CPT | Performed by: INTERNAL MEDICINE

## 2024-11-20 ASSESSMENT — ENCOUNTER SYMPTOMS
LIGHT-HEADEDNESS: 0
DIZZINESS: 0
CHILLS: 0
FEVER: 0
SHORTNESS OF BREATH: 0
COUGH: 0

## 2024-11-20 NOTE — PROGRESS NOTES
"Subjective   Patient ID: Jamie Castro is a 39 y.o. female who presents for Follow-up (Phentermine follow up).    HPI  Patient thinks the medication is working for weight loss. Patient lost 11 lbs since last office visit. Patient did well with the medication but her BMI is now below 27 and we must stop phentermine at this time. Patient has an IUD and does not have menstrual periods.  We discussed other options for weight loss at this time at this time she would just like to monitor her weight and follow-up in a month for recheck.  She has lab work completed for her regular primary care physician.  No other complaints or concerns at this time.    Review of Systems   Constitutional:  Negative for chills and fever.   Eyes:  Negative for visual disturbance.   Respiratory:  Negative for cough and shortness of breath.    Cardiovascular:  Negative for chest pain.   Neurological:  Negative for dizziness, syncope and light-headedness.       Objective   /79 (BP Location: Left arm, Patient Position: Sitting, BP Cuff Size: Adult)   Pulse 63   Temp 36.5 °C (97.7 °F)   Ht 1.651 m (5' 5\")   Wt 73.5 kg (162 lb)   BMI 26.96 kg/m²     Physical Exam  Vitals and nursing note reviewed.   Constitutional:       General: She is not in acute distress.     Appearance: Normal appearance. She is not ill-appearing, toxic-appearing or diaphoretic.   HENT:      Head: Normocephalic and atraumatic.      Nose: Nose normal.   Cardiovascular:      Rate and Rhythm: Normal rate and regular rhythm.      Heart sounds: Normal heart sounds.   Pulmonary:      Effort: Pulmonary effort is normal. No respiratory distress.      Breath sounds: Normal breath sounds. No wheezing, rhonchi or rales.   Musculoskeletal:      Right lower leg: No edema.      Left lower leg: No edema.   Skin:     General: Skin is warm and dry.      Coloration: Skin is not jaundiced or pale.      Findings: No rash.   Neurological:      General: No focal deficit present.      " Mental Status: She is alert and oriented to person, place, and time.   Psychiatric:         Mood and Affect: Mood normal.         Behavior: Behavior normal.         Thought Content: Thought content normal.         Judgment: Judgment normal.         Assessment/Plan   Problem List Items Addressed This Visit             ICD-10-CM    Weight gain R63.5    Encounter for weight loss counseling - Primary Z71.3    Need for influenza vaccination Z23    Relevant Orders    Flu vaccine, trivalent, preservative free, age 6 months and greater (Fluarix/Fluzone/Flulaval) (Completed)    Overweight with body mass index (BMI) of 26 to 26.9 in adult E66.3, Z68.26     Counter for weight loss counseling/overweight/weight gain: Patient following up today to review weight loss treatment.  She has been on phentermine for 1 month is now lost 12 pounds on the medication in 1 month.  Her BMI is 26.96.  At this time I have to take her off of the medication due to FDA regulations of the medication specifying if her BMI greater than 27.  We discussed that we could add metformin to her regimen and see if this also would help further weight loss to her goal of BMI less than 25.  At this time she would like to hold off and continue to lose weight on her own and follow-up in 1 month for recheck.  We discussed that we would have to take a 3-month break from phentermine and in the event that she would gain and go above BMI 27 could restart the medication.  She is not interested in compounding GLP-1 injectable medication at this time.

## 2024-11-21 LAB
EST. AVERAGE GLUCOSE BLD GHB EST-MCNC: 97 MG/DL
HBA1C MFR BLD: 5 %

## 2024-11-25 ENCOUNTER — OFFICE VISIT (OUTPATIENT)
Dept: PRIMARY CARE | Facility: CLINIC | Age: 39
End: 2024-11-25
Payer: COMMERCIAL

## 2024-11-25 VITALS
WEIGHT: 162 LBS | HEIGHT: 65 IN | TEMPERATURE: 97.9 F | SYSTOLIC BLOOD PRESSURE: 112 MMHG | HEART RATE: 85 BPM | BODY MASS INDEX: 26.99 KG/M2 | DIASTOLIC BLOOD PRESSURE: 80 MMHG

## 2024-11-25 DIAGNOSIS — J02.9 PHARYNGITIS, UNSPECIFIED ETIOLOGY: Primary | ICD-10-CM

## 2024-11-25 DIAGNOSIS — E66.3 OVERWEIGHT WITH BODY MASS INDEX (BMI) OF 26 TO 26.9 IN ADULT: ICD-10-CM

## 2024-11-25 DIAGNOSIS — J35.9: ICD-10-CM

## 2024-11-25 PROCEDURE — 3008F BODY MASS INDEX DOCD: CPT | Performed by: FAMILY MEDICINE

## 2024-11-25 PROCEDURE — 1036F TOBACCO NON-USER: CPT | Performed by: FAMILY MEDICINE

## 2024-11-25 PROCEDURE — 99213 OFFICE O/P EST LOW 20 MIN: CPT | Performed by: FAMILY MEDICINE

## 2024-11-25 RX ORDER — PREDNISONE 20 MG/1
TABLET ORAL
Qty: 18 TABLET | Refills: 0 | Status: SHIPPED | OUTPATIENT
Start: 2024-11-25

## 2024-11-25 RX ORDER — CLARITHROMYCIN 500 MG/1
500 TABLET, FILM COATED ORAL 2 TIMES DAILY
Qty: 20 TABLET | Refills: 0 | Status: SHIPPED | OUTPATIENT
Start: 2024-11-25 | End: 2024-12-05

## 2024-11-25 RX ORDER — AZELASTINE 1 MG/ML
1 SPRAY, METERED NASAL 2 TIMES DAILY
Qty: 30 ML | Refills: 12 | Status: SHIPPED | OUTPATIENT
Start: 2024-11-25 | End: 2025-11-25

## 2024-11-25 NOTE — PROGRESS NOTES
Patient is here for follow-up she had a sore throat swollen tonsil and also some kind of weird tingling sensation with a rash in her tongue.  Initially she was concerned went to the ER and they did not know if it was a manifestation of her lupus versus potentially strep.  She was originally given Augmentin and then switched over to doxycycline but she still having pressure in her left neck and ear.    REVIEW OF SYSTEMS: 12 systems negative except for those mentioned in the HPI.    PHYSICAL EXAMINATION:   Constitutional: The patient is alert, in no acute  distress.  Eyes: Extraocular movements are intact. Pupils are equal and reactive to light  ENT: +1 tonsil on the right very little space between the posterior pharynx and the tonsils bilaterally.  Trace fluid behind the right TM versus the left.  Pain in V2 with edema on the right versus the left geographic tongue  Neck: Supple without lymphadenopathy or JVD.  Heart: Regular rate and rhythm without murmur, click, gallop, or rub.  Lungs: Clear to auscultation bilaterally. No rales, rhonchi, or  wheezing.  Psychiatric: Good judgment and insight. Normal affect and mood.  Lymphatic: as noted above.  Skin: no rashes or lesions    Assessment:  per EMR    Plan:  Really no noted lymphadenopathy.  Did review ER note and other things.  Will switch over and will do Biaxin as well as steroid the same time to try and wipe out inflammation in the posterior nasopharynx.  If not would send to ENT for fiberoptic scope and consider possible tonsil adenoidectomy.  Patient is considering this in the past and a lot of other family members including her daughter his knee that as well.  Could also consider blood work for EBV or CMV which could also mimic this though steroid would help can also take L-lysine, vitamin C and got vitamin D    This dictation was created using Dragon dictation and may contain errors

## 2024-12-17 ENCOUNTER — APPOINTMENT (OUTPATIENT)
Dept: PRIMARY CARE | Facility: CLINIC | Age: 39
End: 2024-12-17
Payer: COMMERCIAL

## 2024-12-28 ENCOUNTER — APPOINTMENT (OUTPATIENT)
Dept: URGENT CARE | Age: 39
End: 2024-12-28
Payer: COMMERCIAL

## 2025-01-28 ENCOUNTER — OFFICE VISIT (OUTPATIENT)
Dept: OTOLARYNGOLOGY | Facility: CLINIC | Age: 40
End: 2025-01-28
Payer: COMMERCIAL

## 2025-01-28 ENCOUNTER — CLINICAL SUPPORT (OUTPATIENT)
Dept: AUDIOLOGY | Facility: CLINIC | Age: 40
End: 2025-01-28
Payer: COMMERCIAL

## 2025-01-28 VITALS
HEIGHT: 66 IN | BODY MASS INDEX: 25.78 KG/M2 | TEMPERATURE: 97.8 F | DIASTOLIC BLOOD PRESSURE: 76 MMHG | SYSTOLIC BLOOD PRESSURE: 109 MMHG | WEIGHT: 160.4 LBS

## 2025-01-28 DIAGNOSIS — H93.8X1 EAR PRESSURE, RIGHT: ICD-10-CM

## 2025-01-28 DIAGNOSIS — H92.01 RIGHT EAR PAIN: Primary | ICD-10-CM

## 2025-01-28 DIAGNOSIS — H69.91 DYSFUNCTION OF RIGHT EUSTACHIAN TUBE: ICD-10-CM

## 2025-01-28 DIAGNOSIS — M26.609 TEMPOROMANDIBULAR JOINT DISORDER: ICD-10-CM

## 2025-01-28 DIAGNOSIS — J30.9 CHRONIC ALLERGIC RHINITIS: ICD-10-CM

## 2025-01-28 DIAGNOSIS — H92.01 OTALGIA, RIGHT: Primary | ICD-10-CM

## 2025-01-28 PROCEDURE — 99204 OFFICE O/P NEW MOD 45 MIN: CPT | Performed by: OTOLARYNGOLOGY

## 2025-01-28 PROCEDURE — 3008F BODY MASS INDEX DOCD: CPT | Performed by: OTOLARYNGOLOGY

## 2025-01-28 PROCEDURE — 92550 TYMPANOMETRY & REFLEX THRESH: CPT | Mod: 52 | Performed by: AUDIOLOGIST

## 2025-01-28 PROCEDURE — 1036F TOBACCO NON-USER: CPT | Performed by: OTOLARYNGOLOGY

## 2025-01-28 PROCEDURE — 92557 COMPREHENSIVE HEARING TEST: CPT | Performed by: AUDIOLOGIST

## 2025-01-28 RX ORDER — FLUTICASONE PROPIONATE 50 MCG
2 SPRAY, SUSPENSION (ML) NASAL DAILY
Qty: 48 ML | Refills: 3 | Status: SHIPPED | OUTPATIENT
Start: 2025-01-28 | End: 2025-04-28

## 2025-01-28 ASSESSMENT — PAIN - FUNCTIONAL ASSESSMENT: PAIN_FUNCTIONAL_ASSESSMENT: 0-10

## 2025-01-28 ASSESSMENT — ENCOUNTER SYMPTOMS: OCCASIONAL FEELINGS OF UNSTEADINESS: 0

## 2025-01-28 ASSESSMENT — PAIN SCALES - GENERAL: PAINLEVEL_OUTOF10: 3

## 2025-01-28 NOTE — PROGRESS NOTES
Impression:  1. Right ear pain        2. Temporomandibular joint disorder        3. Dysfunction of right eustachian tube             RECOMMENDATIONS/PLAN :  I reassured the patient that her tympanic membrane's are moving fairly well today however seems like she is dealing with persistent eustachian tube dysfunction in that right ear.  She is also dealing with TMJ pain on that right side.  I gave her the name of a dentist who specializes in TMJ dysfunction.  She will continue to wear her nightguard at night.  We will restart Flonase nasal spray-2 puffs each nostril daily for the next 8 weeks.      **This electronic medical record note was created with the use of voice recognition software.  Despite proofreading, typographical or grammatical errors may be present that could affect meaning of content **    Subjective   Patient ID:     Jamie Castro is a 39 y.o. female who presents to the office today complaining of intermittent pressure in her right ear worse than the left.  She has been treated with steroids and oral antibiotics without significant improvement.  She does have a history of TMJ inflammation on that right side.  Apparently she does have a nightguard that she does wear at night.  She denies any drainage from the ears or any fluctuation in hearing.  She does have crackling that is worse in the right ear.  Currently she is not on any nasal sprays.    ROS:  A detailed 12 system review of systems is noted on the intake form has been reviewed with the patient with details noted in the HPI and scanned into the patient's medical record.    Objective     History reviewed. No pertinent past medical history.     Past Surgical History:   Procedure Laterality Date    APPENDECTOMY      APPENDECTOMY      CHOLECYSTECTOMY      HERNIA REPAIR      OTHER SURGICAL HISTORY  12/13/2018    Colonoscopy    OTHER SURGICAL HISTORY  12/13/2018    Appendectomy    OTHER SURGICAL HISTORY  12/13/2018    Cholecystectomy         Allergies   Allergen Reactions    Shellfish Containing Products Anaphylaxis    Bee Venom Protein (Honey Bee) Other    Fluconazole Hives    Hydrocodone-Acetaminophen Nausea/vomiting    Influenza Virus Vaccines Other     Heart rate too high injection site swollen 2013    Metoclopramide Other          Current Outpatient Medications:     buPROPion SR (Wellbutrin SR) 150 mg 12 hr tablet, TAKE 1 TABLET (150 MG) BY MOUTH 2 TIMES A DAY. DO NOT CRUSH, CHEW, OR SPLIT., Disp: 180 tablet, Rfl: 3    hydroxychloroquine (Plaquenil) 200 mg tablet, Take 1 tablet (200 mg) by mouth 2 times a day., Disp: , Rfl:     predniSONE (Deltasone) 20 mg tablet, Take 3 tabs (60mg) daily for 3 days, then take 2 tabs (40mg) daily for 3 days, then take 1 tab (20mg) daily for 3 days., Disp: 18 tablet, Rfl: 0    rizatriptan (Maxalt) 10 mg tablet, Take 1 tablet (10 mg) by mouth 1 time if needed for migraine. Take 1 tablet by mouth as needed for Migraine Headache (see administration instructions). Take at onset of headache. May repeat after 2 hours. Do not exceed 30 mg per day., Disp: 9 tablet, Rfl: 11    topiramate (Topamax) 50 mg tablet, Take 1 tablet (50 mg) by mouth 2 times a day., Disp: 180 tablet, Rfl: 3    azelastine (Astelin) 137 mcg (0.1 %) nasal spray, Administer 1 spray into each nostril 2 times a day. Use in each nostril as directed (Patient not taking: Reported on 1/28/2025), Disp: 30 mL, Rfl: 12    EPINEPHrine 0.3 mg/0.3 mL injection syringe, INJECT 0.3 MG INTRAMUSCULAR ONCE, Disp: , Rfl:     ondansetron (Zofran) 8 mg tablet, Take 1 tablet (8 mg) by mouth 3 times a day as needed for nausea., Disp: , Rfl:     phentermine (Adipex-P) 37.5 mg tablet, Take 1 tablet (37.5 mg) by mouth once daily in the morning. Take before meals., Disp: 30 tablet, Rfl: 0     Tobacco Use: Medium Risk (1/28/2025)    Patient History     Smoking Tobacco Use: Former     Smokeless Tobacco Use: Never     Passive Exposure: Not on file        Alcohol Use: Not At Risk  "(5/1/2024)    AUDIT-C     Frequency of Alcohol Consumption: Monthly or less     Average Number of Drinks: 1 or 2     Frequency of Binge Drinking: Never        Social History     Substance and Sexual Activity   Drug Use Never        Physical Exam:  Visit Vitals  /76   Temp 36.6 °C (97.8 °F) (Temporal)   Ht 1.676 m (5' 6\")   Wt 72.8 kg (160 lb 6.4 oz)   BMI 25.89 kg/m²   Smoking Status Former   BSA 1.84 m²      General: Patient is alert, oriented, cooperative in no apparent distress.  Head: Normocephalic, atraumatic.  Eyes: PERRL, EOMI, Conjunctiva is clear. No nystagmus.  Ears: Right Ear-- Pinna is normal.  External auditory canal is patent. Tympanic membrane is [intact, translucent and has good mobility with my pneumatic otoscope. No effusion].  Mastoid is nontender.  Left ear-- Pinna is normal.  External auditory canal is patent. Tympanic membrane is [intact, translucent and has good mobility with my pneumatic otoscope.  No effusion].  Mastoid is nontender.  TMJ: She does have tenderness to palpation along her right TMJ joint with occasional clicking.  Nose: Septum is relatively straight.  No septal perforation or lesions. No septal hematoma/ seroma.  No signs of bleeding.  Inferior turbinates are moderately swollen and pale.   No evidence of intranasal polyps.  No infectious drainage.  Throat:  Floor of mouth is clear, no masses.  Tongue appears normal, no lesions or masses. Gums, gingiva, buccal mucosa appear pink and moist, no lesions. Teeth are in good repair.  No obvious dental infections.  Peritonsillar regions appear symmetric without swelling.  Hard and soft palate appear normal, no obvious cleft. Uvula is midline.  Oropharynx: No lesions. Retropharyngeal wall is flat.  No active postnasal drip.  Neck: Supple,  no lymphadenopathy.  No masses.  Salivary Glands: Symmetric bilaterally.  No palpable masses.  No evidence of acute infection or salivary stones  Neurologic: Cranial Nerves 2-12 are grossly " intact without focal deficits. Cerebellar function testing is normal.     Results:   I reviewed her recent audiogram and she has a mild high-frequency sensorineural loss in both ears.  Both tympanic membranes have normal mobility on tympanometry.  Word recognition scores were 100% bilaterally.  Speech reception threshold is 10 dB bilaterally.    Procedure:   []    Guanaco Davenport, DO

## 2025-01-28 NOTE — PROGRESS NOTES
AUDIOLOGY ADULT AUDIOMETRIC EVALUATION      Name:  Jamie Castro  :  1985  Age:  39 y.o.  Date of Evaluation: 25    History:  Reason for visit:  Ms. Castro was seen today as part of the visit with Guanaco Davenport D.O. for an evaluation of hearing.   Chief Complaint   Patient presents with    Earache    Ear Pressure     Reported for approximately the past four months she has been experiencing right sided otalgia, in addition to a sensation of swelling. Mentioned the current pain has been a 3/10, however, it may increase at times. Stated she has noticed a sensation of swelling behind the ear and has some concern for a swollen tonsil on the right side.   Mentioned she feels a sensation of fluid inside the right ear, along with a sensation of ear pressure. She has completed steroids, antibiotics, nasal spray and allergy medications and has not noticed any significant changes in her symptoms.   Stated with swallowing she may notice some crackling sounds in the ear. History of TMJ disorder.   Reported she may have experienced some recurrent infections prior to the ear pain.   History of some noise exposure while serving in the [employer]. Noted some occasional non-pulsatile non-bothersome ringing tinnitus at times. Denied any concerns for hearing loss at this time.   Denied any dizziness/vertigo, ear surgery, recent falls, sinus/throat concerns, ear drainage, or sudden hearing loss.    EVALUATION     See Audiogram    RESULTS:    Otoscopic Evaluation:   Right Ear: Otoscopy revealed a clear healthy canal and a healthy tympanic membrane was visualized.   Left Ear:  Otoscopy revealed a clear healthy canal and a healthy tympanic membrane was visualized.     Immittance:  Immittance Measures: 226 Hz   Right Ear: Tympanometric testing revealed a normal type A tympanogram with normal middle ear pressure and normal static compliance.  Left Ear: Tympanometric testing revealed a normal type A tympanogram with normal  middle ear pressure and normal static compliance.    Right: Ipsilateral acoustic reflexes were present at, 500-4,000 Hz, at expected sensation levels.  Left Ear: Ipsilateral acoustic reflexes were present at, 500-4,000 Hz, at expected sensation levels.    Test technique:  Pure Tone Audiometry via TDH headphones    Reliability:   excellent    Pure Tone Audiometry:    Right Ear: Audiometric testing indicated normal peripheral hearing sensitivity from 125-8,000 Hz.  Left Ear:   Audiometric testing indicated normal peripheral hearing sensitivity from 125-8,000 Hz.      Speech Audiometry:   Right Ear:  Speech Reception Threshold (SRT) was obtained at 10 dBHL                 Word Recognition scores were excellent (100%) in quiet when words were presented at 50 dBHL  Left Ear:  Speech Reception Threshold (SRT) was obtained at 10 dBHL                 Word Recognition scores were excellent (100%) in quiet when words were presented at 50 dBHL  Testing was performed with recorded NU-6 speech words in quiet. Speech thresholds were in good agreement with the pure tone averages in each ear.     IMPRESSIONS:  Today's test results are consistent with normal peripheral hearing sensitivity, bilaterally.  The patient was counseled with regard to the findings.    RECOMMENDATIONS:  * Continue medical follow up with Guanaco Davenport D.O.  * Retest as medically indicated, or sooner if a change in hearing sensitivity or tinnitus is noticed.   * Wear hearing protection while in the presence of loud sounds.   * Use tinnitus coping strategies as needed, such as sound apps on a smart phone, utilizing calming noise in the room, running a fan at night, etc.   * Use effective communication strategies such as asking the speaker to gain attention prior to speaking, speaking in the same room, repeating words that were heard, etc.    PATIENT EDUCATION:   Discussed results and recommendations with the patient and a copy of the hearing test was provided.   Questions were addressed and the patient was encouraged to contact our department should concerns arise.  The patient was seen from  1:30-2:00 pm.

## 2025-06-03 ENCOUNTER — HOSPITAL ENCOUNTER (OUTPATIENT)
Dept: RADIOLOGY | Facility: CLINIC | Age: 40
Discharge: HOME | End: 2025-06-03
Payer: COMMERCIAL

## 2025-06-03 ENCOUNTER — OFFICE VISIT (OUTPATIENT)
Dept: PRIMARY CARE | Facility: CLINIC | Age: 40
End: 2025-06-03
Payer: COMMERCIAL

## 2025-06-03 VITALS
SYSTOLIC BLOOD PRESSURE: 112 MMHG | OXYGEN SATURATION: 98 % | WEIGHT: 164 LBS | HEIGHT: 66 IN | DIASTOLIC BLOOD PRESSURE: 80 MMHG | HEART RATE: 95 BPM | BODY MASS INDEX: 26.36 KG/M2

## 2025-06-03 DIAGNOSIS — G43.811 OTHER MIGRAINE WITH STATUS MIGRAINOSUS, INTRACTABLE: Primary | ICD-10-CM

## 2025-06-03 DIAGNOSIS — R51.9 NONINTRACTABLE HEADACHE, UNSPECIFIED CHRONICITY PATTERN, UNSPECIFIED HEADACHE TYPE: ICD-10-CM

## 2025-06-03 DIAGNOSIS — G43.811 OTHER MIGRAINE WITH STATUS MIGRAINOSUS, INTRACTABLE: ICD-10-CM

## 2025-06-03 DIAGNOSIS — M54.2 NECK PAIN: ICD-10-CM

## 2025-06-03 PROCEDURE — 3008F BODY MASS INDEX DOCD: CPT | Performed by: INTERNAL MEDICINE

## 2025-06-03 PROCEDURE — 70450 CT HEAD/BRAIN W/O DYE: CPT

## 2025-06-03 PROCEDURE — 99214 OFFICE O/P EST MOD 30 MIN: CPT | Performed by: INTERNAL MEDICINE

## 2025-06-03 RX ORDER — METHYLPREDNISOLONE 4 MG/1
TABLET ORAL
Qty: 21 TABLET | Refills: 0 | Status: SHIPPED | OUTPATIENT
Start: 2025-06-03 | End: 2025-06-09

## 2025-06-03 RX ORDER — ZAVEGEPANT 10 MG/.1ML
10 SPRAY NASAL DAILY
Qty: 1 EACH | Refills: 0 | Status: SHIPPED | COMMUNITY
Start: 2025-06-03

## 2025-06-03 RX ORDER — CYCLOBENZAPRINE HCL 10 MG
10 TABLET ORAL NIGHTLY PRN
Qty: 5 TABLET | Refills: 0 | Status: SHIPPED | OUTPATIENT
Start: 2025-06-03

## 2025-06-03 ASSESSMENT — ENCOUNTER SYMPTOMS
OCCASIONAL FEELINGS OF UNSTEADINESS: 0
LOSS OF SENSATION IN FEET: 0
DEPRESSION: 0

## 2025-06-03 ASSESSMENT — PATIENT HEALTH QUESTIONNAIRE - PHQ9
2. FEELING DOWN, DEPRESSED OR HOPELESS: NOT AT ALL
SUM OF ALL RESPONSES TO PHQ9 QUESTIONS 1 & 2: 0
1. LITTLE INTEREST OR PLEASURE IN DOING THINGS: NOT AT ALL

## 2025-06-03 NOTE — PROGRESS NOTES
"Chief Complaint   Patient presents with    Headache     Has been having headaches x 5 since last Wednesday. Has been really bad.        39 y.o. woman  Accompanied by her daughter  Last visit 10/2024    Urgent visit for evaluation of headache.  History of migraines.  However this headache started about 6 days ago.  She then had an appointment that was 4 hours long to be tested for ADHD.  Her headache worsened.  She was prescribed Vyvanse and had intense worsening of her headache.  She did take several doses of her triptan throughout this.  She also indicates that prior to this she did have an injection of the right shoulder and wonders if this triggered it.  She has chronic neck pain and did see an orthopedic doctor for this.  Patient recalls that she had imaging of the cervical spine and was told that she did not have the normal curvature and that there was straightening.  They are planning physical therapy.  This headache is more intense and different than other headaches.     Past Medical History  Depression and anxiety  GERD EGD and colonoscopy Dr Manuel Quinonesgy to flu vaccine  Ovarian cyst  Migraine headaches. 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  (daughter has medical problems)       Family: Mom breast cancer age 59.      Blood pressure 112/80, pulse 95, height 1.676 m (5' 6\"), weight 74.4 kg (164 lb), SpO2 98%.  Body mass index is 26.47 kg/m².    Vital reviewed  Neck: no cervical/clavicular adenopathy.  Limited flexion extension  Extremities: no pretibial edema  Neuro: speech intact.  Extraocular movements intact.  Strength 5 out of 5.  Sensation intact to light touch.  No tremor noted.    No facial asymmetry    Labs 01/2023 per Dr Dill  Labs ER (on phone) elevated glucose. "     ASSESSMENT/PLAN    1. Other migraine with status migrainosus, intractable (Primary)  Sample of zavpret given -watched video on how to administer.   If refractory then medrol.   If that does not work rtc.   Can continue triptan prn.   - CT head wo IV contrast; Future  - zavegepant (Zavzpret) 10 mg/actuation spray,non-aerosol; Administer 10 mg into affected nostril(s) once daily.  Dispense: 1 each; Refill: 0  - methylPREDNISolone (Medrol Dospak) 4 mg tablets; Take as directed on package.  Dispense: 21 tablet; Refill: 0    2. Nonintractable headache, unspecified chronicity pattern, unspecified headache type  - CT head wo IV contrast; Future  - methylPREDNISolone (Medrol Dospak) 4 mg tablets; Take as directed on package.  Dispense: 21 tablet; Refill: 0    3. Neck pain  Medication use discussed with patient including potential benefits and side effects. Patient verbalized understanding and agreed to proceed.   Previously took without s.e.   - cyclobenzaprine (Flexeril) 10 mg tablet; Take 1 tablet (10 mg) by mouth as needed at bedtime for muscle spasms. Do not drive after taking it.  Dispense: 5 tablet; Refill: 0    Labs as previously ordered  Had mammogram per gyn for cyst    Pap per Dr Stern        Current Outpatient Medications on File Prior to Visit   Medication Sig Dispense Refill    buPROPion SR (Wellbutrin SR) 150 mg 12 hr tablet TAKE 1 TABLET (150 MG) BY MOUTH 2 TIMES A DAY. DO NOT CRUSH, CHEW, OR SPLIT. 180 tablet 3    EPINEPHrine 0.3 mg/0.3 mL injection syringe INJECT 0.3 MG INTRAMUSCULAR ONCE      hydroxychloroquine (Plaquenil) 200 mg tablet Take 1 tablet (200 mg) by mouth 2 times a day.      ondansetron (Zofran) 8 mg tablet Take 1 tablet (8 mg) by mouth 3 times a day as needed for nausea.      rizatriptan (Maxalt) 10 mg tablet Take 1 tablet (10 mg) by mouth 1 time if needed for migraine. Take 1 tablet by mouth as needed for Migraine Headache (see administration instructions). Take at onset of headache. May  repeat after 2 hours. Do not exceed 30 mg per day. 9 tablet 11    topiramate (Topamax) 50 mg tablet Take 1 tablet (50 mg) by mouth 2 times a day. 180 tablet 3    azelastine (Astelin) 137 mcg (0.1 %) nasal spray Administer 1 spray into each nostril 2 times a day. Use in each nostril as directed (Patient not taking: Reported on 1/28/2025) 30 mL 12    fluticasone (Flonase) 50 mcg/actuation nasal spray Administer 2 sprays into each nostril once daily. 48 mL 3    phentermine (Adipex-P) 37.5 mg tablet Take 1 tablet (37.5 mg) by mouth once daily in the morning. Take before meals. 30 tablet 0    [DISCONTINUED] predniSONE (Deltasone) 20 mg tablet Take 3 tabs (60mg) daily for 3 days, then take 2 tabs (40mg) daily for 3 days, then take 1 tab (20mg) daily for 3 days. (Patient not taking: Reported on 6/3/2025) 18 tablet 0     No current facility-administered medications on file prior to visit.

## 2025-08-07 ENCOUNTER — HOSPITAL ENCOUNTER (OUTPATIENT)
Dept: RADIOLOGY | Facility: CLINIC | Age: 40
Discharge: HOME | End: 2025-08-07
Payer: COMMERCIAL

## 2025-08-07 ENCOUNTER — OFFICE VISIT (OUTPATIENT)
Dept: ORTHOPEDIC SURGERY | Facility: CLINIC | Age: 40
End: 2025-08-07
Payer: COMMERCIAL

## 2025-08-07 DIAGNOSIS — S43.431A GLENOID LABRAL TEAR, RIGHT, INITIAL ENCOUNTER: Primary | ICD-10-CM

## 2025-08-07 DIAGNOSIS — S43.001A SUBLUXATION OF TENDON OF LONG HEAD OF RIGHT BICEPS: ICD-10-CM

## 2025-08-07 DIAGNOSIS — M25.511 RIGHT SHOULDER PAIN, UNSPECIFIED CHRONICITY: ICD-10-CM

## 2025-08-07 PROCEDURE — L3670 SO ACRO/CLAV CAN WEB PRE OTS: HCPCS | Performed by: ORTHOPAEDIC SURGERY

## 2025-08-07 PROCEDURE — 73030 X-RAY EXAM OF SHOULDER: CPT | Mod: RT

## 2025-08-07 NOTE — PROGRESS NOTES
History of Present Illness   Chief Complaint   Patient presents with    Right Shoulder - Pain     Xrays today        The patient is here with a complaint of side: right shoulder pain.  The patient is side: right hand dominant.  They have had 12 weeks of shoulder pain.  The pain began after lifting weights doing seated chest flys and felt a pop and instant pain and weakness.  Her shoulder will pop at times and still having anterior pain.      Medical History[1]    Medication Documentation Review Audit       Reviewed by Caroline Sommers MD (Physician) on 25 at 1256      Medication Order Taking? Sig Documenting Provider Last Dose Status   azelastine (Astelin) 137 mcg (0.1 %) nasal spray [id number]  Administer 1 spray into each nostril 2 times a day. Use in each nostril as directed   Patient not taking: Reported on 2025    Rivas Millan, DO  Active   buPROPion SR (Wellbutrin SR) 150 mg 12 hr tablet [id number] Yes TAKE 1 TABLET (150 MG) BY MOUTH 2 TIMES A DAY. DO NOT CRUSH, CHEW, OR SPLIT. Caroline Sommers MD  Active   cyclobenzaprine (Flexeril) 10 mg tablet [id number]  Take 1 tablet (10 mg) by mouth as needed at bedtime for muscle spasms. Do not drive after taking it. Caroline Sommers MD  Active   EPINEPHrine 0.3 mg/0.3 mL injection syringe [id number] Yes INJECT 0.3 MG INTRAMUSCULAR ONCE Historical Provider, MD  Active   fluticasone (Flonase) 50 mcg/actuation nasal spray [id number]  Administer 2 sprays into each nostril once daily. Guanaco Davenport, DO   25 2359   hydroxychloroquine (Plaquenil) 200 mg tablet [id number] Yes Take 1 tablet (200 mg) by mouth 2 times a day. Historical Provider, MD  Active   methylPREDNISolone (Medrol Dospak) 4 mg tablets [id number]  Take as directed on package. Caroline Sommers MD  Active   ondansetron (Zofran) 8 mg tablet [id number] Yes Take 1 tablet (8 mg) by mouth 3 times a day as needed for nausea. Historical Provider, MD  Active   phentermine (Adipex-P) 37.5 mg tablet  [id number]  Take 1 tablet (37.5 mg) by mouth once daily in the morning. Take before meals. Gavino Crawford,    24 2356    Patient not taking:   Discontinued 25 1211   rizatriptan (Maxalt) 10 mg tablet [id number] Yes Take 1 tablet (10 mg) by mouth 1 time if needed for migraine. Take 1 tablet by mouth as needed for Migraine Headache (see administration instructions). Take at onset of headache. May repeat after 2 hours. Do not exceed 30 mg per day. Caroline Sommers MD  Active   topiramate (Topamax) 50 mg tablet [id number] Yes Take 1 tablet (50 mg) by mouth 2 times a day. Caroline Sommers MD  Active   zavegepant (Zavzpret) 10 mg/actuation spray,non-aerosol [id number]  Administer 10 mg into affected nostril(s) once daily. Caroline Sommers MD  Active                    RX Allergies[2]    Social History     Socioeconomic History    Marital status:      Spouse name: Not on file    Number of children: Not on file    Years of education: Not on file    Highest education level: Not on file   Occupational History    Not on file   Tobacco Use    Smoking status: Former     Current packs/day: 0.00     Types: Cigarettes     Quit date:      Years since quitting: 15.6    Smokeless tobacco: Never   Vaping Use    Vaping status: Never Used   Substance and Sexual Activity    Alcohol use: Not Currently     Alcohol/week: 1.0 standard drink of alcohol     Types: 1 Standard drinks or equivalent per week     Comment: rare    Drug use: Never    Sexual activity: Not on file   Other Topics Concern    Not on file   Social History Narrative    Not on file     Social Drivers of Health     Financial Resource Strain: Not on file   Food Insecurity: Not on file   Transportation Needs: No Transportation Needs (2024)    PRAPARE - Transportation     Lack of Transportation (Medical): No     Lack of Transportation (Non-Medical): No   Physical Activity: Sufficiently Active (2024)    Exercise Vital Sign     Days of Exercise  per Week: 3 days     Minutes of Exercise per Session: 60 min   Stress: Not on file   Social Connections: Not on file   Intimate Partner Violence: Not on file   Housing Stability: Not on file       Surgical History[3]      Location: in the anterior glenohumeral region  Pain level: 10  Description: aching, sore, and weak and sharp  Pain with: reaching, repetitive use, and exercise  Night pain: pain at night.     Review of Systems   GENERAL: Negative  GI: Negative  MUSCULOSKELETAL: See HPI  SKIN: Negative  NEURO:  Negative     Physical Exam:    This is a 39-year-old female in no acute distress  Neck is supple nontender, Spurling's test is negative  side: right Shoulder:  There is no tenderness to palpation over the acromioclavicular joint.  Active range of motion: Forward elevation 170, internal rotation L1, external rotation 90 abduction 90  Jobes test positive for pain negative for weakness  External rotation test positive for pain negative for weakness  Belly press test positive for pain negative for weakness  Shannon's test positive for pain negative for weakness  She has pain with Yergason's Test and palpation of the long head of the biceps.  Negative Patria's test  Elbow and wrist motion were not irritable.  Radial pulse 2+ and palpable.     Imaging  MR joint upper extremity w contrast RT  MR MR JOINT UPPER EXTREMITY W CONTRAST RIGHT  CLINICAL STATEMENT:  M75.51  M54.2;OTHER REASON.   TECHNOLOGIST NOTES: WHAT SYMPTOMS ARE YOU EXPERIENCING?; 1ml Vueway, right shoulder pain, neck pain, pop in right shoulder during a workout about 3 months ago, limited range of motion, pain in shoulder when lifting arm, right arm weakness    TECHNIQUE: Multiplanar multisequence MR images of the shoulder were obtained with the use of intra-articular contrast.      COMPARISON: None       FINDINGS:  There is no evidence of acute fracture, osteonecrosis or suspicious marrow lesion.  There are small subchondral cysts in the posterior  lateral humeral head.  The acromioclavicular joint appears intact.  A type 1 acromial undersurface is seen.  The coracoclavicular and coracoacromial ligaments are intact.    There is no subdeltoid subacromial bursitis.  The teres minor is intact.  The infraspinatus is intact. The supraspinatus is intact. Subscapularis tendinosis with partial tearing involving the upper tendon, the uppermost tendon slip has full-thickness tearing at the myotendinous junction for example on series 5 image 8 . The long head of the biceps tendon is maintained in anatomic location, without tear.    There is no acute Hill-Sacks or Bankart lesion.  The anterior labrum is intact.  The superior labrum is intact. Posterior superior labrum is blunted and attenuated. The inferior labrum is intact.  The anterior band of the inferior glenohumeral ligament is attenuated and thickened especially at the glenoid attachment, sequela of remote injury.  No high-grade chondral lesions are present in the glenohumeral joint.   The suprascapular and spinoglenoid notches as well as the quadrilateral space have preserved fat planes.    There is no evidence of muscle atrophy or acute muscle denervation.  Nonspecific axillary lymph nodes measuring up to 1.2 cm.  IMPRESSION:  1.  Subscapularis tendinosis with partial tearing involving the upper tendon, the uppermost tendon slip has full-thickness tearing at the myotendinous junction.  2.  Posterior superior labrum is blunted and attenuated, early degeneration versus tearing. Additionally there are small subchondral cysts in the posterior lateral humeral head, constellation of findings can be seen with chronic posterior/internal impingement.  3.  Nonspecific axillary lymph nodes measuring up to 1.2 cm.  Additional incidental findings as detailed above.  Electronically signed by:  Rupal Alvarez MD  08/06/2025 03:29 PM EDT  Workstation: [id number]  Technologist:  BRIGHT MCCLELLAN  Dictated By:   RUPAL ALVAREZ MD  Signed  By:     KIM RINCON GAVINO  Signed Out:    25 15:29:01         Assessment   side: right Shoulder pain  Glenoid labral tear and biceps subluxation    Plan  Right shoulder arthroscopy with possible glenoid labral repair and biceps   Tenodesis  I believe that the biceps is subluxing giving her her pain and symptoms.  I am unsure if the musculotendinous junction tear of the subscapularis would be repairable.  The procedure was explained with the risk, benefits alternatives being reviewed.  Potential risk limited to infection, neurovascular complication, adhesive capsulitis, failure to repair were all discussed with the patient and she consents to the procedure.  Follow-up postoperatively  All questions answered    RX Allergies[4]    REVIEW OF SYSTEMS  General: Negative for malaise, significant weight loss, fever  Musculoskeletal: See HPI  Neuro:  Negative for numbness/tingling      Medication Documentation Review Audit       Reviewed by Jennifer Branham MA (Medical Assistant) on 25 at 0827      Medication Order Taking? Sig Documenting Provider Last Dose Status   azelastine (Astelin) 137 mcg (0.1 %) nasal spray [id number]  Administer 1 spray into each nostril 2 times a day. Use in each nostril as directed   Patient not taking: Reported on 2025    Rivas Millan,   Active   buPROPion SR (Wellbutrin SR) 150 mg 12 hr tablet [id number]  TAKE 1 TABLET (150 MG) BY MOUTH 2 TIMES A DAY. DO NOT CRUSH, CHEW, OR SPLIT. Caroline Sommers MD  Active   cyclobenzaprine (Flexeril) 10 mg tablet [id number]  Take 1 tablet (10 mg) by mouth as needed at bedtime for muscle spasms. Do not drive after taking it. Caroline Sommers MD  Active   EPINEPHrine 0.3 mg/0.3 mL injection syringe [id number]  INJECT 0.3 MG INTRAMUSCULAR ONCE Historical Provider, MD  Active   fluticasone (Flonase) 50 mcg/actuation nasal spray [id number]  Administer 2 sprays into each nostril once daily. Guanaco Davenport DO   25 5218   hydroxychloroquine  (Plaquenil) 200 mg tablet [id number] No Take 1 tablet (200 mg) by mouth 2 times a day. Historical Provider, MD Taking Active   ondansetron (Zofran) 8 mg tablet [id number] No Take 1 tablet (8 mg) by mouth 3 times a day as needed for nausea. Historical Provider, MD Taking Active   phentermine (Adipex-P) 37.5 mg tablet [id number]  Take 1 tablet (37.5 mg) by mouth once daily in the morning. Take before meals. Gavino Crawford, DO   24 2356   rizatriptan (Maxalt) 10 mg tablet [id number]  Take 1 tablet (10 mg) by mouth 1 time if needed for migraine. Take 1 tablet by mouth as needed for Migraine Headache (see administration instructions). Take at onset of headache. May repeat after 2 hours. Do not exceed 30 mg per day. Caroline Sommers MD  Active   topiramate (Topamax) 50 mg tablet [id number]  Take 1 tablet (50 mg) by mouth 2 times a day. Caroline Sommers MD  Active   zavegepant (Zavzpret) 10 mg/actuation spray,non-aerosol [id number]  Administer 10 mg into affected nostril(s) once daily. Caroline Sommers MD  Active                    HPI:  Right shoulder pain after lifting injury, MRI confirms glenoid labral tear with superior musculotendinous junction tearing of the subscapularis.    PHYSICAL EXAM  General Appearance/Mental Status: A&Ox3, no apparent distress  HEENT: Normal  Lungs: Clear  Heart: RRR  Abdomen: Soft, nontender  Extremities: Abnormal pain and popping sensation subluxation, positive Patria's test    Assessment:  Right shoulder glenoid labral tear with biceps subluxation    Plan:  Right shoulder arthroscopy with glenoid labral repair and biceps tenodesis  All medications and allergies reviewed  All questions answered  UltraSling dispensed today  Follow-up postoperatively         [1] No past medical history on file.  [2]   Allergies  Allergen Reactions    Shellfish Containing Products Anaphylaxis    Bee Venom Protein (Honey Bee) Other    Fluconazole Hives    Hydrocodone-Acetaminophen Nausea/vomiting     Influenza Virus Vaccines Other     Heart rate too high injection site swollen 2013    Metoclopramide Other   [3]   Past Surgical History:  Procedure Laterality Date    APPENDECTOMY      APPENDECTOMY      CHOLECYSTECTOMY      HERNIA REPAIR      OTHER SURGICAL HISTORY  12/13/2018    Colonoscopy    OTHER SURGICAL HISTORY  12/13/2018    Appendectomy    OTHER SURGICAL HISTORY  12/13/2018    Cholecystectomy   [4]   Allergies  Allergen Reactions    Shellfish Containing Products Anaphylaxis    Bee Venom Protein (Honey Bee) Other    Fluconazole Hives    Hydrocodone-Acetaminophen Nausea/vomiting    Influenza Virus Vaccines Other     Heart rate too high injection site swollen 2013    Metoclopramide Other

## 2025-08-12 ENCOUNTER — TELEPHONE (OUTPATIENT)
Dept: ORTHOPEDIC SURGERY | Facility: CLINIC | Age: 40
End: 2025-08-12
Payer: COMMERCIAL

## 2025-08-13 ENCOUNTER — LAB (OUTPATIENT)
Dept: LAB | Facility: HOSPITAL | Age: 40
End: 2025-08-13
Payer: COMMERCIAL

## 2025-08-13 DIAGNOSIS — Z01.818 ENCOUNTER FOR OTHER PREPROCEDURAL EXAMINATION: Primary | ICD-10-CM

## 2025-08-13 PROBLEM — S46.111A STRAIN OF MUSCLE, FASCIA AND TENDON OF LONG HEAD OF BICEPS, RIGHT ARM, INITIAL ENCOUNTER: Status: ACTIVE | Noted: 2025-08-13

## 2025-08-13 PROBLEM — S43.431A SUPERIOR GLENOID LABRUM LESION OF RIGHT SHOULDER: Status: ACTIVE | Noted: 2025-08-13

## 2025-08-13 PROCEDURE — 85730 THROMBOPLASTIN TIME PARTIAL: CPT

## 2025-08-13 PROCEDURE — 85025 COMPLETE CBC W/AUTO DIFF WBC: CPT

## 2025-08-13 PROCEDURE — 80053 COMPREHEN METABOLIC PANEL: CPT

## 2025-08-13 PROCEDURE — 83036 HEMOGLOBIN GLYCOSYLATED A1C: CPT

## 2025-08-13 PROCEDURE — 36415 COLL VENOUS BLD VENIPUNCTURE: CPT

## 2025-08-13 PROCEDURE — 85610 PROTHROMBIN TIME: CPT

## 2025-08-14 ENCOUNTER — ANESTHESIA EVENT (OUTPATIENT)
Dept: OPERATING ROOM | Facility: HOSPITAL | Age: 40
End: 2025-08-14
Payer: COMMERCIAL

## 2025-08-14 RX ORDER — DEXTROAMPHETAMINE SACCHARATE, AMPHETAMINE ASPARTATE, DEXTROAMPHETAMINE SULFATE AND AMPHETAMINE SULFATE 2.5; 2.5; 2.5; 2.5 MG/1; MG/1; MG/1; MG/1
10 TABLET ORAL 2 TIMES DAILY
COMMUNITY
Start: 2025-07-28

## 2025-08-15 ENCOUNTER — ANESTHESIA (OUTPATIENT)
Dept: OPERATING ROOM | Facility: HOSPITAL | Age: 40
End: 2025-08-15
Payer: COMMERCIAL

## 2025-08-15 ENCOUNTER — HOSPITAL ENCOUNTER (OUTPATIENT)
Facility: HOSPITAL | Age: 40
Setting detail: OUTPATIENT SURGERY
Discharge: HOME | End: 2025-08-15
Attending: ORTHOPAEDIC SURGERY | Admitting: ORTHOPAEDIC SURGERY
Payer: COMMERCIAL

## 2025-08-15 VITALS
HEART RATE: 85 BPM | SYSTOLIC BLOOD PRESSURE: 105 MMHG | HEIGHT: 67 IN | WEIGHT: 155.42 LBS | DIASTOLIC BLOOD PRESSURE: 65 MMHG | RESPIRATION RATE: 14 BRPM | TEMPERATURE: 97.2 F | OXYGEN SATURATION: 98 % | BODY MASS INDEX: 24.39 KG/M2

## 2025-08-15 DIAGNOSIS — S46.111A STRAIN OF MUSCLE, FASCIA AND TENDON OF LONG HEAD OF BICEPS, RIGHT ARM, INITIAL ENCOUNTER: ICD-10-CM

## 2025-08-15 DIAGNOSIS — Z98.890 S/P ARTHROSCOPY OF RIGHT SHOULDER: Primary | ICD-10-CM

## 2025-08-15 DIAGNOSIS — S43.431A SUPERIOR GLENOID LABRUM LESION OF RIGHT SHOULDER, INITIAL ENCOUNTER: ICD-10-CM

## 2025-08-15 LAB — PREGNANCY TEST URINE, POC: NEGATIVE

## 2025-08-15 PROCEDURE — 3600000004 HC OR TIME - INITIAL BASE CHARGE - PROCEDURE LEVEL FOUR: Performed by: ORTHOPAEDIC SURGERY

## 2025-08-15 PROCEDURE — 2500000004 HC RX 250 GENERAL PHARMACY W/ HCPCS (ALT 636 FOR OP/ED): Mod: JZ | Performed by: STUDENT IN AN ORGANIZED HEALTH CARE EDUCATION/TRAINING PROGRAM

## 2025-08-15 PROCEDURE — 2780000003 HC OR 278 NO HCPCS: Performed by: ORTHOPAEDIC SURGERY

## 2025-08-15 PROCEDURE — C1713 ANCHOR/SCREW BN/BN,TIS/BN: HCPCS | Performed by: ORTHOPAEDIC SURGERY

## 2025-08-15 PROCEDURE — 3600000009 HC OR TIME - EACH INCREMENTAL 1 MINUTE - PROCEDURE LEVEL FOUR: Performed by: ORTHOPAEDIC SURGERY

## 2025-08-15 PROCEDURE — 7100000010 HC PHASE TWO TIME - EACH INCREMENTAL 1 MINUTE: Performed by: ORTHOPAEDIC SURGERY

## 2025-08-15 PROCEDURE — 3700000002 HC GENERAL ANESTHESIA TIME - EACH INCREMENTAL 1 MINUTE: Performed by: ORTHOPAEDIC SURGERY

## 2025-08-15 PROCEDURE — 81025 URINE PREGNANCY TEST: CPT | Performed by: PHYSICIAN ASSISTANT

## 2025-08-15 PROCEDURE — 2500000004 HC RX 250 GENERAL PHARMACY W/ HCPCS (ALT 636 FOR OP/ED): Mod: JW | Performed by: ANESTHESIOLOGY

## 2025-08-15 PROCEDURE — 3700000001 HC GENERAL ANESTHESIA TIME - INITIAL BASE CHARGE: Performed by: ORTHOPAEDIC SURGERY

## 2025-08-15 PROCEDURE — 2500000004 HC RX 250 GENERAL PHARMACY W/ HCPCS (ALT 636 FOR OP/ED): Performed by: NURSE ANESTHETIST, CERTIFIED REGISTERED

## 2025-08-15 PROCEDURE — 2500000004 HC RX 250 GENERAL PHARMACY W/ HCPCS (ALT 636 FOR OP/ED): Performed by: ANESTHESIOLOGY

## 2025-08-15 PROCEDURE — 7100000001 HC RECOVERY ROOM TIME - INITIAL BASE CHARGE: Performed by: ORTHOPAEDIC SURGERY

## 2025-08-15 PROCEDURE — 2500000005 HC RX 250 GENERAL PHARMACY W/O HCPCS: Performed by: ORTHOPAEDIC SURGERY

## 2025-08-15 PROCEDURE — 29806 SHO ARTHRS SRG CAPSULORRAPHY: CPT | Performed by: ORTHOPAEDIC SURGERY

## 2025-08-15 PROCEDURE — 2720000007 HC OR 272 NO HCPCS: Performed by: ORTHOPAEDIC SURGERY

## 2025-08-15 PROCEDURE — 7100000002 HC RECOVERY ROOM TIME - EACH INCREMENTAL 1 MINUTE: Performed by: ORTHOPAEDIC SURGERY

## 2025-08-15 PROCEDURE — 7100000009 HC PHASE TWO TIME - INITIAL BASE CHARGE: Performed by: ORTHOPAEDIC SURGERY

## 2025-08-15 DEVICE — IMPLANTABLE DEVICE: Type: IMPLANTABLE DEVICE | Site: SHOULDER | Status: FUNCTIONAL

## 2025-08-15 RX ORDER — FENTANYL CITRATE 50 UG/ML
INJECTION, SOLUTION INTRAMUSCULAR; INTRAVENOUS AS NEEDED
Status: DISCONTINUED | OUTPATIENT
Start: 2025-08-15 | End: 2025-08-15

## 2025-08-15 RX ORDER — ROCURONIUM BROMIDE 10 MG/ML
INJECTION, SOLUTION INTRAVENOUS AS NEEDED
Status: DISCONTINUED | OUTPATIENT
Start: 2025-08-15 | End: 2025-08-15

## 2025-08-15 RX ORDER — ACETAMINOPHEN 325 MG/1
650 TABLET ORAL EVERY 4 HOURS PRN
Status: DISCONTINUED | OUTPATIENT
Start: 2025-08-15 | End: 2025-08-15 | Stop reason: HOSPADM

## 2025-08-15 RX ORDER — DIPHENHYDRAMINE HYDROCHLORIDE 50 MG/ML
INJECTION, SOLUTION INTRAMUSCULAR; INTRAVENOUS AS NEEDED
Status: DISCONTINUED | OUTPATIENT
Start: 2025-08-15 | End: 2025-08-15

## 2025-08-15 RX ORDER — OXYCODONE HYDROCHLORIDE 5 MG/1
10 TABLET ORAL EVERY 4 HOURS PRN
Status: DISCONTINUED | OUTPATIENT
Start: 2025-08-15 | End: 2025-08-15 | Stop reason: HOSPADM

## 2025-08-15 RX ORDER — LIDOCAINE HYDROCHLORIDE 10 MG/ML
0.1 INJECTION, SOLUTION EPIDURAL; INFILTRATION; INTRACAUDAL; PERINEURAL ONCE
Status: DISCONTINUED | OUTPATIENT
Start: 2025-08-15 | End: 2025-08-15 | Stop reason: HOSPADM

## 2025-08-15 RX ORDER — CEFAZOLIN SODIUM 2 G/50ML
2 SOLUTION INTRAVENOUS ONCE
Status: DISCONTINUED | OUTPATIENT
Start: 2025-08-15 | End: 2025-08-15 | Stop reason: HOSPADM

## 2025-08-15 RX ORDER — LIDOCAINE HYDROCHLORIDE 20 MG/ML
INJECTION, SOLUTION INFILTRATION; PERINEURAL AS NEEDED
Status: DISCONTINUED | OUTPATIENT
Start: 2025-08-15 | End: 2025-08-15

## 2025-08-15 RX ORDER — MIDAZOLAM HYDROCHLORIDE 1 MG/ML
INJECTION, SOLUTION INTRAMUSCULAR; INTRAVENOUS
Status: COMPLETED | OUTPATIENT
Start: 2025-08-15 | End: 2025-08-15

## 2025-08-15 RX ORDER — PROPOFOL 10 MG/ML
INJECTION, EMULSION INTRAVENOUS AS NEEDED
Status: DISCONTINUED | OUTPATIENT
Start: 2025-08-15 | End: 2025-08-15

## 2025-08-15 RX ORDER — CEFAZOLIN 1 G/1
INJECTION, POWDER, FOR SOLUTION INTRAVENOUS AS NEEDED
Status: DISCONTINUED | OUTPATIENT
Start: 2025-08-15 | End: 2025-08-15

## 2025-08-15 RX ORDER — MEPERIDINE HYDROCHLORIDE 25 MG/ML
12.5 INJECTION INTRAMUSCULAR; INTRAVENOUS; SUBCUTANEOUS EVERY 10 MIN PRN
Status: DISCONTINUED | OUTPATIENT
Start: 2025-08-15 | End: 2025-08-15 | Stop reason: HOSPADM

## 2025-08-15 RX ORDER — SODIUM CHLORIDE, SODIUM LACTATE, POTASSIUM CHLORIDE, CALCIUM CHLORIDE 600; 310; 30; 20 MG/100ML; MG/100ML; MG/100ML; MG/100ML
100 INJECTION, SOLUTION INTRAVENOUS CONTINUOUS
Status: DISCONTINUED | OUTPATIENT
Start: 2025-08-15 | End: 2025-08-15 | Stop reason: HOSPADM

## 2025-08-15 RX ORDER — ONDANSETRON HYDROCHLORIDE 2 MG/ML
4 INJECTION, SOLUTION INTRAVENOUS ONCE AS NEEDED
Status: DISCONTINUED | OUTPATIENT
Start: 2025-08-15 | End: 2025-08-15 | Stop reason: HOSPADM

## 2025-08-15 RX ORDER — SODIUM CHLORIDE 0.9 G/100ML
INJECTION, SOLUTION IRRIGATION AS NEEDED
Status: DISCONTINUED | OUTPATIENT
Start: 2025-08-15 | End: 2025-08-15 | Stop reason: HOSPADM

## 2025-08-15 RX ORDER — FAMOTIDINE 10 MG/ML
INJECTION INTRAVENOUS AS NEEDED
Status: DISCONTINUED | OUTPATIENT
Start: 2025-08-15 | End: 2025-08-15

## 2025-08-15 RX ORDER — ONDANSETRON HYDROCHLORIDE 2 MG/ML
INJECTION, SOLUTION INTRAVENOUS AS NEEDED
Status: DISCONTINUED | OUTPATIENT
Start: 2025-08-15 | End: 2025-08-15

## 2025-08-15 RX ORDER — KETOROLAC TROMETHAMINE 15 MG/ML
30 INJECTION, SOLUTION INTRAMUSCULAR; INTRAVENOUS ONCE
Status: COMPLETED | OUTPATIENT
Start: 2025-08-15 | End: 2025-08-15

## 2025-08-15 RX ORDER — DIPHENHYDRAMINE HYDROCHLORIDE 50 MG/ML
12.5 INJECTION, SOLUTION INTRAMUSCULAR; INTRAVENOUS ONCE AS NEEDED
Status: DISCONTINUED | OUTPATIENT
Start: 2025-08-15 | End: 2025-08-15 | Stop reason: HOSPADM

## 2025-08-15 RX ORDER — FENTANYL CITRATE 50 UG/ML
25 INJECTION, SOLUTION INTRAMUSCULAR; INTRAVENOUS EVERY 5 MIN PRN
Status: DISCONTINUED | OUTPATIENT
Start: 2025-08-15 | End: 2025-08-15 | Stop reason: HOSPADM

## 2025-08-15 RX ORDER — SODIUM CHLORIDE, SODIUM LACTATE, POTASSIUM CHLORIDE, CALCIUM CHLORIDE 600; 310; 30; 20 MG/100ML; MG/100ML; MG/100ML; MG/100ML
INJECTION, SOLUTION INTRAVENOUS CONTINUOUS PRN
Status: DISCONTINUED | OUTPATIENT
Start: 2025-08-15 | End: 2025-08-15

## 2025-08-15 RX ORDER — OXYCODONE HYDROCHLORIDE 5 MG/1
5 TABLET ORAL EVERY 4 HOURS PRN
Status: DISCONTINUED | OUTPATIENT
Start: 2025-08-15 | End: 2025-08-15 | Stop reason: HOSPADM

## 2025-08-15 RX ORDER — ONDANSETRON 4 MG/1
4 TABLET, ORALLY DISINTEGRATING ORAL EVERY 8 HOURS PRN
Qty: 20 TABLET | Refills: 0 | Status: SHIPPED | OUTPATIENT
Start: 2025-08-15

## 2025-08-15 RX ORDER — OXYCODONE HYDROCHLORIDE 5 MG/1
5 TABLET ORAL EVERY 6 HOURS PRN
Qty: 28 TABLET | Refills: 0 | Status: SHIPPED | OUTPATIENT
Start: 2025-08-15 | End: 2025-08-22

## 2025-08-15 RX ORDER — ALBUTEROL SULFATE 0.83 MG/ML
2.5 SOLUTION RESPIRATORY (INHALATION) ONCE AS NEEDED
Status: DISCONTINUED | OUTPATIENT
Start: 2025-08-15 | End: 2025-08-15 | Stop reason: HOSPADM

## 2025-08-15 RX ADMIN — MIDAZOLAM 2 MG: 1 INJECTION INTRAMUSCULAR; INTRAVENOUS at 11:50

## 2025-08-15 RX ADMIN — ROCURONIUM BROMIDE 60 MG: 10 SOLUTION INTRAVENOUS at 12:33

## 2025-08-15 RX ADMIN — DIPHENHYDRAMINE HYDROCHLORIDE 12.5 MG: 50 INJECTION INTRAMUSCULAR; INTRAVENOUS at 12:37

## 2025-08-15 RX ADMIN — DEXAMETHASONE SODIUM PHOSPHATE 8 MG: 4 INJECTION, SOLUTION INTRAMUSCULAR; INTRAVENOUS at 12:37

## 2025-08-15 RX ADMIN — ONDANSETRON 4 MG: 2 INJECTION, SOLUTION INTRAMUSCULAR; INTRAVENOUS at 13:20

## 2025-08-15 RX ADMIN — FENTANYL CITRATE 50 MCG: 50 INJECTION, SOLUTION INTRAMUSCULAR; INTRAVENOUS at 13:06

## 2025-08-15 RX ADMIN — SODIUM CHLORIDE, POTASSIUM CHLORIDE, SODIUM LACTATE AND CALCIUM CHLORIDE: 600; 310; 30; 20 INJECTION, SOLUTION INTRAVENOUS at 12:31

## 2025-08-15 RX ADMIN — DEXAMETHASONE SODIUM PHOSPHATE: 10 INJECTION INTRAMUSCULAR; INTRAVENOUS at 12:02

## 2025-08-15 RX ADMIN — FAMOTIDINE 20 MG: 10 INJECTION, SOLUTION INTRAVENOUS at 12:37

## 2025-08-15 RX ADMIN — HYDROMORPHONE HYDROCHLORIDE 0.5 MG: 1 INJECTION, SOLUTION INTRAMUSCULAR; INTRAVENOUS; SUBCUTANEOUS at 14:11

## 2025-08-15 RX ADMIN — PROPOFOL 200 MG: 10 INJECTION, EMULSION INTRAVENOUS at 12:33

## 2025-08-15 RX ADMIN — KETOROLAC TROMETHAMINE 30 MG: 15 INJECTION, SOLUTION INTRAMUSCULAR; INTRAVENOUS at 14:30

## 2025-08-15 RX ADMIN — FENTANYL CITRATE 50 MCG: 50 INJECTION, SOLUTION INTRAMUSCULAR; INTRAVENOUS at 12:33

## 2025-08-15 RX ADMIN — CEFAZOLIN 2 G: 330 INJECTION, POWDER, FOR SOLUTION INTRAMUSCULAR; INTRAVENOUS at 12:37

## 2025-08-15 RX ADMIN — MEPERIDINE HYDROCHLORIDE 12.5 MG: 25 INJECTION INTRAMUSCULAR; INTRAVENOUS; SUBCUTANEOUS at 14:05

## 2025-08-15 RX ADMIN — SUGAMMADEX 200 MG: 100 INJECTION, SOLUTION INTRAVENOUS at 13:45

## 2025-08-15 RX ADMIN — LIDOCAINE HYDROCHLORIDE 40 MG: 20 INJECTION, SOLUTION INFILTRATION; PERINEURAL at 12:33

## 2025-08-15 SDOH — HEALTH STABILITY: MENTAL HEALTH: CURRENT SMOKER: 0

## 2025-08-15 ASSESSMENT — PAIN - FUNCTIONAL ASSESSMENT
PAIN_FUNCTIONAL_ASSESSMENT: 0-10
PAIN_FUNCTIONAL_ASSESSMENT: 0-10
PAIN_FUNCTIONAL_ASSESSMENT: WONG-BAKER FACES
PAIN_FUNCTIONAL_ASSESSMENT: 0-10
PAIN_FUNCTIONAL_ASSESSMENT: WONG-BAKER FACES
PAIN_FUNCTIONAL_ASSESSMENT: 0-10
PAIN_FUNCTIONAL_ASSESSMENT: 0-10
PAIN_FUNCTIONAL_ASSESSMENT: WONG-BAKER FACES
PAIN_FUNCTIONAL_ASSESSMENT: 0-10

## 2025-08-15 ASSESSMENT — PAIN SCALES - GENERAL
PAINLEVEL_OUTOF10: 3
PAINLEVEL_OUTOF10: 0 - NO PAIN
PAINLEVEL_OUTOF10: 5 - MODERATE PAIN
PAINLEVEL_OUTOF10: 0 - NO PAIN
PAINLEVEL_OUTOF10: 7
PAIN_LEVEL: 0
PAINLEVEL_OUTOF10: 1
PAINLEVEL_OUTOF10: 0 - NO PAIN
PAINLEVEL_OUTOF10: 1
PAINLEVEL_OUTOF10: 0 - NO PAIN
PAINLEVEL_OUTOF10: 7
PAINLEVEL_OUTOF10: 0 - NO PAIN
PAINLEVEL_OUTOF10: 3
PAINLEVEL_OUTOF10: 0 - NO PAIN
PAINLEVEL_OUTOF10: 9

## 2025-08-15 ASSESSMENT — PAIN DESCRIPTION - DESCRIPTORS
DESCRIPTORS: BURNING;ACHING
DESCRIPTORS: DULL;ACHING
DESCRIPTORS: SORE
DESCRIPTORS: SHARP;TIGHTNESS;SQUEEZING

## 2025-08-15 ASSESSMENT — PAIN DESCRIPTION - LOCATION
LOCATION: ABDOMEN
LOCATION: ARM

## 2025-08-15 ASSESSMENT — PAIN DESCRIPTION - ORIENTATION: ORIENTATION: RIGHT

## 2025-08-20 ENCOUNTER — TELEPHONE (OUTPATIENT)
Dept: ORTHOPEDIC SURGERY | Facility: CLINIC | Age: 40
End: 2025-08-20
Payer: COMMERCIAL

## 2025-08-20 DIAGNOSIS — L30.9 DERMATITIS: Primary | ICD-10-CM

## 2025-08-20 RX ORDER — METHYLPREDNISOLONE 4 MG/1
TABLET ORAL
Qty: 21 TABLET | Refills: 0 | Status: SHIPPED | OUTPATIENT
Start: 2025-08-20 | End: 2025-08-29 | Stop reason: HOSPADM

## 2025-08-22 ENCOUNTER — OFFICE VISIT (OUTPATIENT)
Dept: ORTHOPEDIC SURGERY | Facility: CLINIC | Age: 40
End: 2025-08-22
Payer: COMMERCIAL

## 2025-08-22 DIAGNOSIS — Z98.890 S/P ARTHROSCOPY OF RIGHT SHOULDER: Primary | ICD-10-CM

## 2025-08-22 PROCEDURE — 99212 OFFICE O/P EST SF 10 MIN: CPT | Performed by: ORTHOPAEDIC SURGERY

## 2025-08-22 RX ORDER — OXYCODONE HYDROCHLORIDE 5 MG/1
5 TABLET ORAL EVERY 6 HOURS PRN
Qty: 28 TABLET | Refills: 0 | Status: SHIPPED | OUTPATIENT
Start: 2025-08-22 | End: 2025-08-29

## 2025-08-22 RX ORDER — CYCLOBENZAPRINE HCL 10 MG
10 TABLET ORAL 3 TIMES DAILY PRN
Qty: 30 TABLET | Refills: 0 | Status: SHIPPED | OUTPATIENT
Start: 2025-08-22 | End: 2025-09-01

## 2025-08-25 ENCOUNTER — OFFICE VISIT (OUTPATIENT)
Dept: ORTHOPEDIC SURGERY | Facility: CLINIC | Age: 40
End: 2025-08-25
Payer: COMMERCIAL

## 2025-08-25 DIAGNOSIS — Z98.890 S/P ARTHROSCOPY OF RIGHT SHOULDER: ICD-10-CM

## 2025-08-25 PROCEDURE — 99212 OFFICE O/P EST SF 10 MIN: CPT | Performed by: INTERNAL MEDICINE

## 2025-08-25 PROCEDURE — 99024 POSTOP FOLLOW-UP VISIT: CPT | Performed by: INTERNAL MEDICINE

## 2025-08-25 RX ORDER — CEPHALEXIN 500 MG/1
500 CAPSULE ORAL 3 TIMES DAILY
Qty: 15 CAPSULE | Refills: 0 | Status: SHIPPED | OUTPATIENT
Start: 2025-08-25 | End: 2025-08-30

## 2025-08-26 ENCOUNTER — OFFICE VISIT (OUTPATIENT)
Dept: ORTHOPEDIC SURGERY | Facility: CLINIC | Age: 40
End: 2025-08-26
Payer: COMMERCIAL

## 2025-08-26 DIAGNOSIS — Z98.890 S/P ARTHROSCOPY OF RIGHT SHOULDER: Primary | ICD-10-CM

## 2025-08-26 DIAGNOSIS — T14.8XXA HEMATOMA: ICD-10-CM

## 2025-08-26 PROCEDURE — 99024 POSTOP FOLLOW-UP VISIT: CPT | Performed by: ORTHOPAEDIC SURGERY

## 2025-08-27 ENCOUNTER — TELEPHONE (OUTPATIENT)
Dept: ORTHOPEDIC SURGERY | Facility: CLINIC | Age: 40
End: 2025-08-27
Payer: COMMERCIAL

## 2025-08-28 ENCOUNTER — HOSPITAL ENCOUNTER (OUTPATIENT)
Dept: RADIOLOGY | Facility: CLINIC | Age: 40
Discharge: HOME | End: 2025-08-28
Payer: COMMERCIAL

## 2025-08-28 ENCOUNTER — OFFICE VISIT (OUTPATIENT)
Dept: ORTHOPEDIC SURGERY | Facility: CLINIC | Age: 40
End: 2025-08-28
Payer: COMMERCIAL

## 2025-08-28 DIAGNOSIS — M79.2 NERVE PAIN: ICD-10-CM

## 2025-08-28 DIAGNOSIS — Z98.890 S/P ARTHROSCOPY OF RIGHT SHOULDER: ICD-10-CM

## 2025-08-28 DIAGNOSIS — M96.840 POSTOPERATIVE HEMATOMA OF MUSCULOSKELETAL STRUCTURE FOLLOWING MUSCULOSKELETAL PROCEDURE: Primary | ICD-10-CM

## 2025-08-28 DIAGNOSIS — M96.840 POSTOPERATIVE HEMATOMA OF MUSCULOSKELETAL STRUCTURE FOLLOWING MUSCULOSKELETAL PROCEDURE: ICD-10-CM

## 2025-08-28 PROBLEM — M96.841: Status: ACTIVE | Noted: 2025-08-28

## 2025-08-28 PROCEDURE — 99024 POSTOP FOLLOW-UP VISIT: CPT | Performed by: ORTHOPAEDIC SURGERY

## 2025-08-28 PROCEDURE — 73221 MRI JOINT UPR EXTREM W/O DYE: CPT | Mod: RT

## 2025-08-28 PROCEDURE — 1036F TOBACCO NON-USER: CPT | Performed by: ORTHOPAEDIC SURGERY

## 2025-08-28 RX ORDER — PREGABALIN 75 MG/1
75 CAPSULE ORAL 2 TIMES DAILY
Qty: 60 CAPSULE | Refills: 0 | Status: SHIPPED | OUTPATIENT
Start: 2025-08-28 | End: 2025-09-27

## 2025-08-28 RX ORDER — OXYCODONE HYDROCHLORIDE 5 MG/1
5 TABLET ORAL EVERY 6 HOURS PRN
Qty: 28 TABLET | Refills: 0 | Status: SHIPPED | OUTPATIENT
Start: 2025-08-28 | End: 2025-09-04

## 2025-08-28 RX ORDER — CEFAZOLIN SODIUM 2 G/100ML
2 INJECTION, SOLUTION INTRAVENOUS ONCE
Status: CANCELLED | OUTPATIENT
Start: 2025-08-28 | End: 2025-08-28

## 2025-08-29 ENCOUNTER — HOSPITAL ENCOUNTER (OUTPATIENT)
Facility: HOSPITAL | Age: 40
Setting detail: OUTPATIENT SURGERY
Discharge: HOME | End: 2025-08-29
Attending: ORTHOPAEDIC SURGERY | Admitting: ORTHOPAEDIC SURGERY
Payer: COMMERCIAL

## 2025-08-29 ENCOUNTER — ANESTHESIA EVENT (OUTPATIENT)
Dept: OPERATING ROOM | Facility: HOSPITAL | Age: 40
End: 2025-08-29
Payer: COMMERCIAL

## 2025-08-29 ENCOUNTER — ANESTHESIA (OUTPATIENT)
Dept: OPERATING ROOM | Facility: HOSPITAL | Age: 40
End: 2025-08-29
Payer: COMMERCIAL

## 2025-08-29 ASSESSMENT — PAIN - FUNCTIONAL ASSESSMENT
PAIN_FUNCTIONAL_ASSESSMENT: 0-10
PAIN_FUNCTIONAL_ASSESSMENT: UNABLE TO SELF-REPORT
PAIN_FUNCTIONAL_ASSESSMENT: 0-10
PAIN_FUNCTIONAL_ASSESSMENT: UNABLE TO SELF-REPORT
PAIN_FUNCTIONAL_ASSESSMENT: 0-10

## 2025-08-29 ASSESSMENT — PAIN SCALES - GENERAL
PAINLEVEL_OUTOF10: 3
PAINLEVEL_OUTOF10: 6
PAINLEVEL_OUTOF10: 4
PAINLEVEL_OUTOF10: 2
PAINLEVEL_OUTOF10: 8
PAINLEVEL_OUTOF10: 3

## 2025-08-29 ASSESSMENT — PAIN DESCRIPTION - DESCRIPTORS
DESCRIPTORS: BURNING

## 2025-09-02 ENCOUNTER — APPOINTMENT (OUTPATIENT)
Dept: ORTHOPEDIC SURGERY | Facility: CLINIC | Age: 40
End: 2025-09-02
Payer: COMMERCIAL

## 2025-09-09 ENCOUNTER — APPOINTMENT (OUTPATIENT)
Dept: ORTHOPEDIC SURGERY | Facility: CLINIC | Age: 40
End: 2025-09-09
Payer: COMMERCIAL

## 2025-09-25 ENCOUNTER — APPOINTMENT (OUTPATIENT)
Dept: ORTHOPEDIC SURGERY | Facility: CLINIC | Age: 40
End: 2025-09-25
Payer: COMMERCIAL

## (undated) DEVICE — GLOVE, SURGICAL, PROTEXIS PI , 8.0, PF, LF

## (undated) DEVICE — FIBERTAK, CURVED KIT, DISP

## (undated) DEVICE — TUBING, PUMP REDEUCE 8FT STERILE

## (undated) DEVICE — MAT, FLOOR, STANDARD FLUID BARRIER, 32X44, GREEN

## (undated) DEVICE — CANNULA, ARTHROSCOPIC TWIST-IN 7MM

## (undated) DEVICE — STRAP, VELCRO, BODY, 4 X 60IN, NS

## (undated) DEVICE — POSITIONER, CRADLE, HEAD, MEDC, FOAM SLOTTED

## (undated) DEVICE — BLADE, STRYKER, 4.0MM, RESECTOR, F-SERIES

## (undated) DEVICE — CANNULA, ARTHROSCOPIC, 4.5MM TIP, 70MML, DISPOSABLE SET, LF

## (undated) DEVICE — DRAPE, SHEET, U, W/ADHESIVE STRIP, IMPERVIOUS, 60 X 70 IN, DISPOSABLE, LF, STERILE

## (undated) DEVICE — CANNULA, ARTHROSCOPY, UNIVERSAL, 5.5 X 70 MM

## (undated) DEVICE — SYRINGE, 30 CC, LUER LOCK

## (undated) DEVICE — HOLSTER, ELECTROSURGERY ACCESSORY, STERILE

## (undated) DEVICE — GLOVE, SURGICAL, PROTEXIS PI , 8.5, PF, LF

## (undated) DEVICE — BLADE, STRYKER, 3.5MM, AGGRESSIVE PLUS

## (undated) DEVICE — SUTURELASSO, 90D STRAIGHT

## (undated) DEVICE — CANNULA, GEMINI SR8

## (undated) DEVICE — TUBING, SUCTION, 6MM X 10, CLEAN N-COND

## (undated) DEVICE — SUTURE, PROLENE, 0, 30 IN, CT1, BLUE

## (undated) DEVICE — Device

## (undated) DEVICE — SUTURE, ETHILON, 3-0, 18 IN, PS1, BLACK

## (undated) DEVICE — GLOVE, SURGICAL, PROTEXIS PI , 7.5, PF, LF

## (undated) DEVICE — DRESSING, GAUZE, PETROLATUM, STRIP, XEROFORM, 1 X 8 IN, STERILE

## (undated) DEVICE — APPLICATOR, CHLORAPREP, W/ORANGE TINT, 26ML

## (undated) DEVICE — CAUTERY, PENCIL, PUSH BUTTON, SMOKE EVAC, 70MM

## (undated) DEVICE — PROBE, CRUISE ENERGY, ARTHOSCOPIC SERFAS 90-S 4.0MM

## (undated) DEVICE — GOWN, SURGICAL, ROYAL SILK, XXL, STERILE

## (undated) DEVICE — GLOVE, SURGICAL, PROTEXIS PI BLUE W/NEUTHERA, 8.0, PF, LF

## (undated) DEVICE — STAR SLEEVE, COBAN, STRL

## (undated) DEVICE — BLADE, STRYKER, 5.5MM RESECTOR, F-SERIES

## (undated) DEVICE — PROTECTOR, NERVE, ULNAR, PINK

## (undated) DEVICE — MANIFOLD, 4 PORT NEPTUNE STANDARD

## (undated) DEVICE — STRAP, ARM BOARD, 32 X 1.5